# Patient Record
Sex: MALE | Race: WHITE | NOT HISPANIC OR LATINO | Employment: FULL TIME | URBAN - NONMETROPOLITAN AREA
[De-identification: names, ages, dates, MRNs, and addresses within clinical notes are randomized per-mention and may not be internally consistent; named-entity substitution may affect disease eponyms.]

---

## 2022-03-19 ENCOUNTER — HOSPITAL ENCOUNTER (EMERGENCY)
Facility: HOSPITAL | Age: 47
Discharge: HOME | End: 2022-03-19
Attending: EMERGENCY MEDICINE | Admitting: EMERGENCY MEDICINE

## 2022-03-19 ENCOUNTER — APPOINTMENT (OUTPATIENT)
Dept: GENERAL RADIOLOGY | Facility: HOSPITAL | Age: 47
End: 2022-03-19

## 2022-03-19 VITALS
HEIGHT: 69 IN | RESPIRATION RATE: 18 BRPM | OXYGEN SATURATION: 97 % | HEART RATE: 84 BPM | SYSTOLIC BLOOD PRESSURE: 148 MMHG | TEMPERATURE: 96.8 F | BODY MASS INDEX: 31.1 KG/M2 | WEIGHT: 210 LBS | DIASTOLIC BLOOD PRESSURE: 95 MMHG

## 2022-03-19 DIAGNOSIS — N20.1 URETEROLITHIASIS: Primary | ICD-10-CM

## 2022-03-19 DIAGNOSIS — N23 RENAL COLIC ON LEFT SIDE: ICD-10-CM

## 2022-03-19 LAB
ALBUMIN SERPL-MCNC: 4 G/DL (ref 3.5–5.2)
ALBUMIN/GLOB SERPL: 1.7 G/DL
ALP SERPL-CCNC: 80 U/L (ref 39–117)
ALT SERPL W P-5'-P-CCNC: 12 U/L (ref 1–41)
ANION GAP SERPL CALCULATED.3IONS-SCNC: 7.7 MMOL/L (ref 5–15)
AST SERPL-CCNC: 18 U/L (ref 1–40)
BACTERIA UR QL AUTO: ABNORMAL /HPF
BASOPHILS # BLD AUTO: 0.02 10*3/MM3 (ref 0–0.2)
BASOPHILS NFR BLD AUTO: 0.2 % (ref 0–1.5)
BILIRUB SERPL-MCNC: 0.7 MG/DL (ref 0–1.2)
BILIRUB UR QL STRIP: NEGATIVE
BUN SERPL-MCNC: 19 MG/DL (ref 6–20)
BUN/CREAT SERPL: 14.4 (ref 7–25)
CALCIUM SPEC-SCNC: 9.1 MG/DL (ref 8.6–10.5)
CHLORIDE SERPL-SCNC: 101 MMOL/L (ref 98–107)
CLARITY UR: CLEAR
CO2 SERPL-SCNC: 27.3 MMOL/L (ref 22–29)
COLOR UR: YELLOW
CREAT SERPL-MCNC: 1.32 MG/DL (ref 0.76–1.27)
CRP SERPL-MCNC: 1.5 MG/DL (ref 0–0.5)
DEPRECATED RDW RBC AUTO: 42.9 FL (ref 37–54)
EGFRCR SERPLBLD CKD-EPI 2021: 66.5 ML/MIN/1.73
EOSINOPHIL # BLD AUTO: 0.1 10*3/MM3 (ref 0–0.4)
EOSINOPHIL NFR BLD AUTO: 1.1 % (ref 0.3–6.2)
ERYTHROCYTE [DISTWIDTH] IN BLOOD BY AUTOMATED COUNT: 12.4 % (ref 12.3–15.4)
ERYTHROCYTE [SEDIMENTATION RATE] IN BLOOD: 4 MM/HR (ref 0–15)
GLOBULIN UR ELPH-MCNC: 2.4 GM/DL
GLUCOSE SERPL-MCNC: 113 MG/DL (ref 65–99)
GLUCOSE UR STRIP-MCNC: NEGATIVE MG/DL
HCT VFR BLD AUTO: 43.7 % (ref 37.5–51)
HGB BLD-MCNC: 15 G/DL (ref 13–17.7)
HGB UR QL STRIP.AUTO: ABNORMAL
HYALINE CASTS UR QL AUTO: ABNORMAL /LPF
IMM GRANULOCYTES # BLD AUTO: 0.02 10*3/MM3 (ref 0–0.05)
IMM GRANULOCYTES NFR BLD AUTO: 0.2 % (ref 0–0.5)
KETONES UR QL STRIP: NEGATIVE
LEUKOCYTE ESTERASE UR QL STRIP.AUTO: NEGATIVE
LYMPHOCYTES # BLD AUTO: 1.24 10*3/MM3 (ref 0.7–3.1)
LYMPHOCYTES NFR BLD AUTO: 13.7 % (ref 19.6–45.3)
MCH RBC QN AUTO: 32.5 PG (ref 26.6–33)
MCHC RBC AUTO-ENTMCNC: 34.3 G/DL (ref 31.5–35.7)
MCV RBC AUTO: 94.6 FL (ref 79–97)
MONOCYTES # BLD AUTO: 0.84 10*3/MM3 (ref 0.1–0.9)
MONOCYTES NFR BLD AUTO: 9.3 % (ref 5–12)
NEUTROPHILS NFR BLD AUTO: 6.83 10*3/MM3 (ref 1.7–7)
NEUTROPHILS NFR BLD AUTO: 75.5 % (ref 42.7–76)
NITRITE UR QL STRIP: NEGATIVE
NRBC BLD AUTO-RTO: 0 /100 WBC (ref 0–0.2)
PH UR STRIP.AUTO: 7 [PH] (ref 5–8)
PLATELET # BLD AUTO: 161 10*3/MM3 (ref 140–450)
PMV BLD AUTO: 9.2 FL (ref 6–12)
POTASSIUM SERPL-SCNC: 4.1 MMOL/L (ref 3.5–5.2)
PROT SERPL-MCNC: 6.4 G/DL (ref 6–8.5)
PROT UR QL STRIP: NEGATIVE
RBC # BLD AUTO: 4.62 10*6/MM3 (ref 4.14–5.8)
RBC # UR STRIP: ABNORMAL /HPF
REF LAB TEST METHOD: ABNORMAL
SODIUM SERPL-SCNC: 136 MMOL/L (ref 136–145)
SP GR UR STRIP: 1.02 (ref 1–1.03)
SQUAMOUS #/AREA URNS HPF: ABNORMAL /HPF
UROBILINOGEN UR QL STRIP: ABNORMAL
WBC # UR STRIP: ABNORMAL /HPF
WBC NRBC COR # BLD: 9.05 10*3/MM3 (ref 3.4–10.8)

## 2022-03-19 PROCEDURE — 96374 THER/PROPH/DIAG INJ IV PUSH: CPT

## 2022-03-19 PROCEDURE — 25010000002 ONDANSETRON PER 1 MG: Performed by: EMERGENCY MEDICINE

## 2022-03-19 PROCEDURE — 99283 EMERGENCY DEPT VISIT LOW MDM: CPT

## 2022-03-19 PROCEDURE — 86140 C-REACTIVE PROTEIN: CPT | Performed by: EMERGENCY MEDICINE

## 2022-03-19 PROCEDURE — 25010000002 HYDROMORPHONE 1 MG/ML SOLUTION: Performed by: EMERGENCY MEDICINE

## 2022-03-19 PROCEDURE — 96375 TX/PRO/DX INJ NEW DRUG ADDON: CPT

## 2022-03-19 PROCEDURE — 80053 COMPREHEN METABOLIC PANEL: CPT | Performed by: EMERGENCY MEDICINE

## 2022-03-19 PROCEDURE — 74018 RADEX ABDOMEN 1 VIEW: CPT | Performed by: RADIOLOGY

## 2022-03-19 PROCEDURE — 85025 COMPLETE CBC W/AUTO DIFF WBC: CPT | Performed by: EMERGENCY MEDICINE

## 2022-03-19 PROCEDURE — 74018 RADEX ABDOMEN 1 VIEW: CPT

## 2022-03-19 PROCEDURE — 96376 TX/PRO/DX INJ SAME DRUG ADON: CPT

## 2022-03-19 PROCEDURE — 81001 URINALYSIS AUTO W/SCOPE: CPT | Performed by: PHYSICIAN ASSISTANT

## 2022-03-19 PROCEDURE — 85652 RBC SED RATE AUTOMATED: CPT | Performed by: EMERGENCY MEDICINE

## 2022-03-19 RX ORDER — OXYCODONE AND ACETAMINOPHEN 10; 325 MG/1; MG/1
1 TABLET ORAL EVERY 6 HOURS PRN
Qty: 4 TABLET | Refills: 0 | Status: SHIPPED | OUTPATIENT
Start: 2022-03-19 | End: 2022-03-21 | Stop reason: SDUPTHER

## 2022-03-19 RX ORDER — SODIUM CHLORIDE 0.9 % (FLUSH) 0.9 %
10 SYRINGE (ML) INJECTION AS NEEDED
Status: DISCONTINUED | OUTPATIENT
Start: 2022-03-19 | End: 2022-03-19 | Stop reason: HOSPADM

## 2022-03-19 RX ORDER — ONDANSETRON 2 MG/ML
4 INJECTION INTRAMUSCULAR; INTRAVENOUS ONCE
Status: COMPLETED | OUTPATIENT
Start: 2022-03-19 | End: 2022-03-19

## 2022-03-19 RX ADMIN — HYDROMORPHONE HYDROCHLORIDE 1 MG: 1 INJECTION, SOLUTION INTRAMUSCULAR; INTRAVENOUS; SUBCUTANEOUS at 17:01

## 2022-03-19 RX ADMIN — ONDANSETRON 4 MG: 2 INJECTION INTRAMUSCULAR; INTRAVENOUS at 13:13

## 2022-03-19 RX ADMIN — HYDROMORPHONE HYDROCHLORIDE 1 MG: 1 INJECTION, SOLUTION INTRAMUSCULAR; INTRAVENOUS; SUBCUTANEOUS at 13:12

## 2022-03-19 RX ADMIN — SODIUM CHLORIDE 1000 ML: 9 INJECTION, SOLUTION INTRAVENOUS at 13:13

## 2022-03-19 NOTE — ED PROVIDER NOTES
Subjective   46-year-old male who presents to the emergency department with chief complaint left-sided flank pain.  Patient states he was recently seen at [facility] and diagnosed with a 10 mm stone.  Patient has had associated nausea and vomiting.      History provided by:  Patient   used: No    Flank Pain  Pain location:  L flank  Pain quality: aching, sharp and stabbing    Pain radiates to:  Does not radiate  Pain severity:  Moderate  Onset quality:  Gradual  Duration:  1 day  Timing:  Intermittent  Progression:  Worsening  Chronicity:  New  Context: not alcohol use, not awakening from sleep, not eating, not previous surgeries, not recent illness, not recent sexual activity, not recent travel, not retching, not sick contacts, not suspicious food intake and not trauma    Relieved by:  Nothing  Worsened by:  Nothing  Ineffective treatments:  None tried  Associated symptoms: chills, fatigue and nausea    Associated symptoms: no chest pain, no cough, no flatus, no hematemesis, no hematochezia, no shortness of breath, no sore throat, no vaginal bleeding and no vaginal discharge    Risk factors: no alcohol abuse, not elderly, has not had multiple surgeries, no NSAID use, not pregnant and no recent hospitalization        Review of Systems   Constitutional: Positive for chills and fatigue.   HENT: Negative.  Negative for drooling, ear discharge, ear pain, facial swelling, mouth sores, nosebleeds, postnasal drip, rhinorrhea, sinus pain and sore throat.    Respiratory: Negative.  Negative for cough and shortness of breath.    Cardiovascular: Negative.  Negative for chest pain, palpitations and leg swelling.   Gastrointestinal: Positive for abdominal distention, abdominal pain and nausea. Negative for flatus, hematemesis and hematochezia.   Endocrine: Negative.  Negative for heat intolerance, polydipsia, polyphagia and polyuria.   Genitourinary: Positive for flank pain.  Negative for difficulty urinating, penile discharge, penile pain, vaginal bleeding and vaginal discharge.   Musculoskeletal: Negative for back pain, gait problem, joint swelling, myalgias and neck pain.   Skin: Negative.  Negative for color change, pallor, rash and wound.   Hematological: Negative.  Negative for adenopathy. Does not bruise/bleed easily.   Psychiatric/Behavioral: Negative.  Negative for behavioral problems, confusion, decreased concentration, dysphoric mood, hallucinations and self-injury. The patient is not nervous/anxious and is not hyperactive.    All other systems reviewed and are negative.      No past medical history on file.    No Known Allergies    No past surgical history on file.    No family history on file.    Social History     Socioeconomic History   • Marital status: Single           Objective   Physical Exam  Vitals and nursing note reviewed.   Constitutional:       General: He is not in acute distress.     Appearance: Normal appearance. He is normal weight. He is not ill-appearing, toxic-appearing or diaphoretic.   HENT:      Head: Normocephalic and atraumatic.      Right Ear: Tympanic membrane, ear canal and external ear normal. There is no impacted cerumen.      Left Ear: Tympanic membrane, ear canal and external ear normal. There is no impacted cerumen.      Nose: Nose normal. No congestion or rhinorrhea.      Mouth/Throat:      Mouth: Mucous membranes are moist.      Pharynx: Oropharynx is clear. No oropharyngeal exudate or posterior oropharyngeal erythema.   Eyes:      General: No scleral icterus.        Right eye: No discharge.         Left eye: No discharge.      Extraocular Movements: Extraocular movements intact.      Conjunctiva/sclera: Conjunctivae normal.      Pupils: Pupils are equal, round, and reactive to light.   Cardiovascular:      Rate and Rhythm: Normal rate and regular rhythm.      Pulses: Normal pulses.      Heart sounds: Normal heart sounds. No murmur heard.     No friction rub. No gallop.   Pulmonary:      Effort: Pulmonary effort is normal. No respiratory distress.      Breath sounds: Normal breath sounds. No stridor. No wheezing, rhonchi or rales.   Chest:      Chest wall: No tenderness.   Abdominal:      General: Abdomen is flat. Bowel sounds are normal. There is no distension.      Palpations: There is no mass.      Tenderness: There is no abdominal tenderness. There is no left CVA tenderness, guarding or rebound.      Hernia: No hernia is present.   Musculoskeletal:         General: No swelling, tenderness, deformity or signs of injury. Normal range of motion.      Cervical back: Normal range of motion and neck supple. No rigidity or tenderness.      Right lower leg: No edema.      Left lower leg: No edema.   Lymphadenopathy:      Cervical: No cervical adenopathy.   Skin:     General: Skin is warm and dry.      Capillary Refill: Capillary refill takes less than 2 seconds.      Coloration: Skin is not jaundiced or pale.      Findings: No bruising, erythema, lesion or rash.   Neurological:      General: No focal deficit present.      Mental Status: He is alert and oriented to person, place, and time. Mental status is at baseline.      Cranial Nerves: No cranial nerve deficit.      Sensory: No sensory deficit.      Motor: No weakness.      Coordination: Coordination normal.      Gait: Gait normal.      Deep Tendon Reflexes: Reflexes normal.   Psychiatric:         Mood and Affect: Mood normal.         Behavior: Behavior normal.         Thought Content: Thought content normal.         Judgment: Judgment normal.         Procedures           ED Course  ED Course as of 03/19/22 1649   Sat Mar 19, 2022   1354   IMPRESSION:  Left-sided calcifications and moderate volume stool  [BH]   1611 Discussed care with Dr. Schwartz.  States to have the patient follow-up in his office Monday afternoon at 12 and will get patient scheduled for surgery. [BH]   1643 Discussed care with   Radha  as scheduled. [BH]      ED Course User Index  [] Keven Cevallos PA-C KASPER reviewed by Bebeto Covarrubias MD             [facility]    Final diagnoses:   Ureterolithiasis   Renal colic on left side       ED Disposition  ED Disposition     ED Disposition   Discharge    Condition   Stable    Comment   --             Eric Garcia MD  [address] [facility] [address]  [place] [id number] [phone]    Call in 2 days  Follow-up with urology Monday at 12:00.         Medication List      New Prescriptions    oxyCODONE-acetaminophen  MG per tablet  Commonly known as: PERCOCET  Take 1 tablet by mouth Every 6 (Six) Hours As Needed for Moderate Pain  or Severe Pain .           Where to Get Your Medications      These medications were sent to [facility] [address] [place] [address] - [address] [place] [address] [phone]  - [phone] [address] [address]    Phone: [phone]   · oxyCODONE-acetaminophen  MG per tablet          Keven Cevallos PA-C  03/19/22 3210

## 2022-03-21 ENCOUNTER — OFFICE VISIT (OUTPATIENT)
Dept: UROLOGY | Facility: CLINIC | Age: 47
End: 2022-03-21

## 2022-03-21 ENCOUNTER — APPOINTMENT (OUTPATIENT)
Dept: GENERAL RADIOLOGY | Facility: HOSPITAL | Age: 47
End: 2022-03-21

## 2022-03-21 ENCOUNTER — HOSPITAL ENCOUNTER (EMERGENCY)
Facility: HOSPITAL | Age: 47
Discharge: HOME | End: 2022-03-21
Attending: STUDENT IN AN ORGANIZED HEALTH CARE EDUCATION/TRAINING PROGRAM | Admitting: STUDENT IN AN ORGANIZED HEALTH CARE EDUCATION/TRAINING PROGRAM

## 2022-03-21 VITALS
DIASTOLIC BLOOD PRESSURE: 91 MMHG | OXYGEN SATURATION: 93 % | HEART RATE: 64 BPM | WEIGHT: 210 LBS | RESPIRATION RATE: 17 BRPM | SYSTOLIC BLOOD PRESSURE: 112 MMHG | HEIGHT: 69 IN | BODY MASS INDEX: 31.1 KG/M2 | TEMPERATURE: 98.2 F

## 2022-03-21 VITALS — BODY MASS INDEX: 31.1 KG/M2 | WEIGHT: 210 LBS | HEIGHT: 69 IN

## 2022-03-21 DIAGNOSIS — N20.0 RENAL CALCULUS, LEFT: Primary | ICD-10-CM

## 2022-03-21 DIAGNOSIS — N28.9 RENAL INSUFFICIENCY: ICD-10-CM

## 2022-03-21 DIAGNOSIS — N40.1 BENIGN PROSTATIC HYPERPLASIA WITH LOWER URINARY TRACT SYMPTOMS, SYMPTOM DETAILS UNSPECIFIED: ICD-10-CM

## 2022-03-21 DIAGNOSIS — N20.1 URETEROLITHIASIS: Primary | ICD-10-CM

## 2022-03-21 LAB
ALBUMIN SERPL-MCNC: 3.83 G/DL (ref 3.5–5.2)
ALBUMIN/GLOB SERPL: 1.3 G/DL
ALP SERPL-CCNC: 70 U/L (ref 39–117)
ALT SERPL W P-5'-P-CCNC: 13 U/L (ref 1–41)
ANION GAP SERPL CALCULATED.3IONS-SCNC: 8.9 MMOL/L (ref 5–15)
AST SERPL-CCNC: 17 U/L (ref 1–40)
BASOPHILS # BLD AUTO: 0.04 10*3/MM3 (ref 0–0.2)
BASOPHILS NFR BLD AUTO: 0.5 % (ref 0–1.5)
BILIRUB SERPL-MCNC: 0.7 MG/DL (ref 0–1.2)
BILIRUB UR QL STRIP: NEGATIVE
BUN SERPL-MCNC: 21 MG/DL (ref 6–20)
BUN/CREAT SERPL: 13.1 (ref 7–25)
CALCIUM SPEC-SCNC: 8.9 MG/DL (ref 8.6–10.5)
CHLORIDE SERPL-SCNC: 97 MMOL/L (ref 98–107)
CLARITY UR: CLEAR
CO2 SERPL-SCNC: 28.1 MMOL/L (ref 22–29)
COLOR UR: YELLOW
CREAT SERPL-MCNC: 1.6 MG/DL (ref 0.76–1.27)
DEPRECATED RDW RBC AUTO: 42.6 FL (ref 37–54)
EGFRCR SERPLBLD CKD-EPI 2021: 52.8 ML/MIN/1.73
EOSINOPHIL # BLD AUTO: 0.16 10*3/MM3 (ref 0–0.4)
EOSINOPHIL NFR BLD AUTO: 2 % (ref 0.3–6.2)
ERYTHROCYTE [DISTWIDTH] IN BLOOD BY AUTOMATED COUNT: 12.2 % (ref 12.3–15.4)
FLUAV RNA RESP QL NAA+PROBE: NOT DETECTED
FLUBV RNA RESP QL NAA+PROBE: NOT DETECTED
GLOBULIN UR ELPH-MCNC: 3 GM/DL
GLUCOSE SERPL-MCNC: 124 MG/DL (ref 65–99)
GLUCOSE UR STRIP-MCNC: NEGATIVE MG/DL
HCT VFR BLD AUTO: 40.1 % (ref 37.5–51)
HGB BLD-MCNC: 13.8 G/DL (ref 13–17.7)
HGB UR QL STRIP.AUTO: NEGATIVE
HOLD SPECIMEN: NORMAL
HOLD SPECIMEN: NORMAL
IMM GRANULOCYTES # BLD AUTO: 0.01 10*3/MM3 (ref 0–0.05)
IMM GRANULOCYTES NFR BLD AUTO: 0.1 % (ref 0–0.5)
KETONES UR QL STRIP: NEGATIVE
LEUKOCYTE ESTERASE UR QL STRIP.AUTO: NEGATIVE
LYMPHOCYTES # BLD AUTO: 1.41 10*3/MM3 (ref 0.7–3.1)
LYMPHOCYTES NFR BLD AUTO: 17.7 % (ref 19.6–45.3)
MCH RBC QN AUTO: 32.5 PG (ref 26.6–33)
MCHC RBC AUTO-ENTMCNC: 34.4 G/DL (ref 31.5–35.7)
MCV RBC AUTO: 94.4 FL (ref 79–97)
MONOCYTES # BLD AUTO: 0.78 10*3/MM3 (ref 0.1–0.9)
MONOCYTES NFR BLD AUTO: 9.8 % (ref 5–12)
NEUTROPHILS NFR BLD AUTO: 5.57 10*3/MM3 (ref 1.7–7)
NEUTROPHILS NFR BLD AUTO: 69.9 % (ref 42.7–76)
NITRITE UR QL STRIP: NEGATIVE
NRBC BLD AUTO-RTO: 0 /100 WBC (ref 0–0.2)
PH UR STRIP.AUTO: 6 [PH] (ref 5–8)
PLATELET # BLD AUTO: 152 10*3/MM3 (ref 140–450)
PMV BLD AUTO: 9.4 FL (ref 6–12)
POTASSIUM SERPL-SCNC: 4.4 MMOL/L (ref 3.5–5.2)
PROT SERPL-MCNC: 6.8 G/DL (ref 6–8.5)
PROT UR QL STRIP: NEGATIVE
RBC # BLD AUTO: 4.25 10*6/MM3 (ref 4.14–5.8)
SARS-COV-2 RNA RESP QL NAA+PROBE: NOT DETECTED
SODIUM SERPL-SCNC: 134 MMOL/L (ref 136–145)
SP GR UR STRIP: 1.02 (ref 1–1.03)
UROBILINOGEN UR QL STRIP: NORMAL
WBC NRBC COR # BLD: 7.97 10*3/MM3 (ref 3.4–10.8)
WHOLE BLOOD HOLD SPECIMEN: NORMAL
WHOLE BLOOD HOLD SPECIMEN: NORMAL

## 2022-03-21 PROCEDURE — 85025 COMPLETE CBC W/AUTO DIFF WBC: CPT | Performed by: PHYSICIAN ASSISTANT

## 2022-03-21 PROCEDURE — 99203 OFFICE O/P NEW LOW 30 MIN: CPT | Performed by: UROLOGY

## 2022-03-21 PROCEDURE — 99283 EMERGENCY DEPT VISIT LOW MDM: CPT

## 2022-03-21 PROCEDURE — 80053 COMPREHEN METABOLIC PANEL: CPT | Performed by: PHYSICIAN ASSISTANT

## 2022-03-21 PROCEDURE — 25010000002 HYDROMORPHONE 1 MG/ML SOLUTION: Performed by: STUDENT IN AN ORGANIZED HEALTH CARE EDUCATION/TRAINING PROGRAM

## 2022-03-21 PROCEDURE — 36415 COLL VENOUS BLD VENIPUNCTURE: CPT

## 2022-03-21 PROCEDURE — 87636 SARSCOV2 & INF A&B AMP PRB: CPT | Performed by: PHYSICIAN ASSISTANT

## 2022-03-21 PROCEDURE — 96374 THER/PROPH/DIAG INJ IV PUSH: CPT

## 2022-03-21 PROCEDURE — 81003 URINALYSIS AUTO W/O SCOPE: CPT | Performed by: PHYSICIAN ASSISTANT

## 2022-03-21 PROCEDURE — 96375 TX/PRO/DX INJ NEW DRUG ADDON: CPT

## 2022-03-21 PROCEDURE — 74018 RADEX ABDOMEN 1 VIEW: CPT | Performed by: RADIOLOGY

## 2022-03-21 PROCEDURE — 25010000002 ONDANSETRON PER 1 MG: Performed by: STUDENT IN AN ORGANIZED HEALTH CARE EDUCATION/TRAINING PROGRAM

## 2022-03-21 PROCEDURE — 74018 RADEX ABDOMEN 1 VIEW: CPT

## 2022-03-21 RX ORDER — PROMETHAZINE HYDROCHLORIDE 25 MG/1
25 TABLET ORAL EVERY 6 HOURS PRN
Qty: 21 TABLET | Refills: 2 | Status: SHIPPED | OUTPATIENT
Start: 2022-03-21 | End: 2022-08-09

## 2022-03-21 RX ORDER — ONDANSETRON 2 MG/ML
4 INJECTION INTRAMUSCULAR; INTRAVENOUS ONCE
Status: COMPLETED | OUTPATIENT
Start: 2022-03-21 | End: 2022-03-21

## 2022-03-21 RX ORDER — OXYCODONE AND ACETAMINOPHEN 10; 325 MG/1; MG/1
1 TABLET ORAL EVERY 6 HOURS PRN
Qty: 20 TABLET | Refills: 0 | Status: SHIPPED | OUTPATIENT
Start: 2022-03-21 | End: 2022-03-28

## 2022-03-21 RX ORDER — ONDANSETRON 4 MG/1
4 TABLET, FILM COATED ORAL EVERY 6 HOURS PRN
Qty: 15 TABLET | Refills: 0 | Status: SHIPPED | OUTPATIENT
Start: 2022-03-21 | End: 2022-09-19 | Stop reason: SDUPTHER

## 2022-03-21 RX ORDER — OXYCODONE AND ACETAMINOPHEN 10; 325 MG/1; MG/1
1 TABLET ORAL EVERY 6 HOURS PRN
Qty: 10 TABLET | Refills: 0 | Status: SHIPPED | OUTPATIENT
Start: 2022-03-21 | End: 2022-03-28 | Stop reason: SDUPTHER

## 2022-03-21 RX ORDER — GENTAMICIN SULFATE 80 MG/100ML
80 INJECTION, SOLUTION INTRAVENOUS ONCE
Status: CANCELLED | OUTPATIENT
Start: 2022-03-25 | End: 2022-03-21

## 2022-03-21 RX ADMIN — ONDANSETRON 4 MG: 2 INJECTION INTRAMUSCULAR; INTRAVENOUS at 10:32

## 2022-03-21 RX ADMIN — HYDROMORPHONE HYDROCHLORIDE 1 MG: 1 INJECTION, SOLUTION INTRAMUSCULAR; INTRAVENOUS; SUBCUTANEOUS at 10:33

## 2022-03-21 NOTE — ED NOTES
MEDICAL SCREENING:    Reason for Visit: Left-sided flank pain.  History kidney stone.    Patient initially seen in triage.  The patient was advised further evaluation and diagnostic testing will be needed, some of the treatment and testing will be initiated in the lobby in order to begin the process.  The patient will be returned to the waiting area for the time being and possibly be re-assessed by a subsequent ED provider.  The patient will be brought back to the treatment area in as timely manner as possible.         Pradip Weston PA  03/21/22 0909

## 2022-03-21 NOTE — PROGRESS NOTES
Chief Complaint:          Chief Complaint   Patient presents with   • Nephrolithiasis     Er Follow up        HPI:   46 y.o. male accompanied by his mother with a very painful left 12 mm UPJ stone.  This is actually his second stone the first 1 being treated ureteroscopically about 10 years ago.  He has been the emergency room on Friday Saturday and Sunday having severe pain I reviewed the KUB today showing a stone we discussed ureteroscopy which which can be done on Wednesday versus a lithotripsy.  Is to proceed with the least invasive alternative which is lithotripsy on Friday.  Renal calculus-we discussed the presence of the stone. We discussed the various therapeutic options available including percutaneous nephrostolithotomy, ureteroscopy and extracorporeal shockwave  lithotripsy.  We discussed the risks of lithotripsy including the passage of stones, the development of a large string of stones in the distal ureter known as Steinstrasse.  In the 3% incidence of that we will need to proceed with a ureteroscopy for obstructing fragments.  Extremely rare incidence of renal hematoma and the significance of this.  We discussed percutaneous nephrostolithotomy and its use as well as the risks and benefits such as the need for postoperative hospitalization, and the risk of damage to the kidney and the remote risk of a nephrectomy.  We also discussed the use of ureteroscopy in the upper tracts.  That this is as a decreased success rate to completely remove the stones on the first option and that very likely a stent will be required requiring an additional procedure for removal.  We discussed the absolute relative indicators for intervention including the presence of sepsis and pain we cannot control ais the primary need for urgent intervention.  We discussed placement of a stent if indicated and the management of the stent as well.  ESWL-the patient is a candidate for extracorporeal shockwave lithotripsy.  We discussed  the type of stone and the complications associated with the procedure including, but not limited to, pain in the flank, hematoma, spontaneous renal hemorrhage, inadequate fragmentation of stones, the need for passage of the stones, the need for concomitant additional procedures in the range of 24%, the risk of a distal fragment in the range of 3% requiring ureteroscopic removal, and the fact that sometimes a stent is indicated based on the size and the density of the stone as determined on the CAT scan.  Additionally, we discussed percutaneous nephrostolithotomy.  Including the mini PERC.  With its attendant risks of anesthesia bleeding infection and the fact that is a invasive procedure with the remote possibility of a nephrectomy.  We also discussed the use of ureteroscopy which is a rigid or flexible instrument placed up into the kidney to break up stones with the laser beam and very likely a postop stent and a high likelihood of additional concomitant procedures.   Past Medical History:      History reviewed. No pertinent past medical history.      Current Meds:     Current Outpatient Medications   Medication Sig Dispense Refill   • ondansetron (ZOFRAN) 4 MG tablet Take 1 tablet by mouth Every 6 (Six) Hours As Needed for Nausea or Vomiting. 15 tablet 0   • oxyCODONE-acetaminophen (PERCOCET)  MG per tablet Take 1 tablet by mouth Every 6 (Six) Hours As Needed for Moderate Pain  or Severe Pain . 10 tablet 0     No current facility-administered medications for this visit.        Allergies:      No Known Allergies     Past Surgical History:     History reviewed. No pertinent surgical history.      Social History:     Social History     Socioeconomic History   • Marital status: Single       Family History:     History reviewed. No pertinent family history.    Review of Systems:     Review of Systems   Constitutional: Negative.    HENT: Negative.    Eyes: Negative.    Respiratory: Negative.    Cardiovascular:  Negative.    Gastrointestinal: Positive for nausea and vomiting.   Endocrine: Negative.    Genitourinary: Positive for flank pain.   Allergic/Immunologic: Negative.    Neurological: Negative.    Hematological: Negative.    Psychiatric/Behavioral: Negative.        Physical Exam:     Physical Exam  Vitals and nursing note reviewed.   Constitutional:       Appearance: He is well-developed.   HENT:      Head: Normocephalic and atraumatic.   Eyes:      Conjunctiva/sclera: Conjunctivae normal.      Pupils: Pupils are equal, round, and reactive to light.   Cardiovascular:      Rate and Rhythm: Normal rate and regular rhythm.      Heart sounds: Normal heart sounds.   Pulmonary:      Effort: Pulmonary effort is normal.      Breath sounds: Normal breath sounds.   Abdominal:      General: Bowel sounds are normal.      Palpations: Abdomen is soft.   Musculoskeletal:         General: Normal range of motion.      Cervical back: Normal range of motion.   Skin:     General: Skin is warm and dry.   Neurological:      Mental Status: He is alert and oriented to person, place, and time.      Deep Tendon Reflexes: Reflexes are normal and symmetric.   Psychiatric:         Behavior: Behavior normal.         Thought Content: Thought content normal.         Judgment: Judgment normal.         I have reviewed the following portions of the patient's history: Allergies, current medications, past family history, past medical history, past social history, past surgical history, problem list, and ROS and confirm it is accurate.      Procedure:       Assessment/Plan:   Renal calculus-we discussed the presence of the stone. We discussed the various therapeutic options available including percutaneous nephrostolithotomy, ureteroscopy and extracorporeal shockwave  lithotripsy.  We discussed the risks of lithotripsy including the passage of stones, the development of a large string of stones in the distal ureter known as Steinstrasse.  In the 3%  incidence of that we will need to proceed with a ureteroscopy for obstructing fragments.  Extremely rare incidence of renal hematoma and the significance of this.  We discussed percutaneous nephrostolithotomy and its use as well as the risks and benefits such as the need for postoperative hospitalization, and the risk of damage to the kidney and the remote risk of a nephrectomy.  We also discussed the use of ureteroscopy in the upper tracts.  That this is as a decreased success rate to completely remove the stones on the first option and that very likely a stent will be required requiring an additional procedure for removal.  We discussed the absolute relative indicators for intervention including the presence of sepsis and pain we cannot control ais the primary need for urgent intervention.  We discussed placement of a stent if indicated and the management of the stent as well.  For lithotripsy on Friday, March 25  Narcotic pain medication-patient has significant acute pain that I believe would be an indication for the use of narcotic pain medication.  I discussed the significant risks of pain medication and the fact that this will be a short only option and I will give her no more than a three-day supply of pain medication, I will not plan long-term medication, and that this will be sent to a pain clinic if at all becomes necessary.  We discussed signing a pain medication agreement and the fact that we're going to run a state [facility] review to be sure the patient is not getting pain medication from elsewhere.  If this is the case, we will not give pain medication as part of the patient's treatment plan of there being prescribed a controlled substance with potential for abuse.  This patient has been well aware of the appropriate dose of such medications including the risks for somnolence, limited ability to drive and/or safety and the significant potential for overdose.  It has been made clear that these medications  are for the prescribed patient only without concomitant use of alcohol or other substance unless prescribed by the medical provider.  Has completed prescribing agreement detailing the terms of continue prescribing him a controlled substance including monitoring Jill reports, the possibility of urine drug screens, and pill counts.  The patient is aware that we review JILL reports on a regular basis and scan them into the chart.  History and physical examination exhibited continued safe and appropriate use of controlled substances. We also discussed the fact that the new [place] legislation allows only a three-day prescription for pain medication.  In this situation he will be referred to a chronic pain clinic.  Nausea and vomiting-patient has significant nausea and vomiting as a consequence of this.  We recommended Phenergan.  We also discussed the use of Zofran and the sedating side effects of Phenergan as well.  BPH: Discussed the pathophysiology of BPH and obstruction.  We discussed the static and dynamic effects of BPH as well as using 5 alpha reductase inhibitors versus alpha blockade.  We discussed the indications for transurethral surgery as well and/ or other therapeutic options available including all of the newer techniques.  He continues on Flomax for BPH symptomatology                This document has been electronically signed by PIPPA LIZARRAGA MD March 21, 2022 11:46 EDT

## 2022-03-21 NOTE — ED PROVIDER NOTES
Subjective   46-year-old white male presents secondary to left leg pain.  Patient's been having pain on and off since Friday.  Patient was seen and [facility].  He presents with a CT showing a proximal ureter ureteral stone.  Patient had KUB here which also showed said stone.  Patient's continue to have pain.  He is out of pain medication at this time.  He has not had a follow-up with urology as of today.  He is hopeful to be able to follow-up with urology and get this taken care of.  Patient states he had a similar episode 10 years ago and had to have the stone removed.  He voices no other complaints at this time.          Review of Systems   Constitutional: Negative.  Negative for fever.   HENT: Negative.    Respiratory: Negative.    Cardiovascular: Negative.  Negative for chest pain.   Gastrointestinal: Positive for nausea. Negative for abdominal pain and vomiting.   Endocrine: Negative.    Genitourinary: Positive for flank pain. Negative for dysuria.   Skin: Negative.    Neurological: Negative.    Psychiatric/Behavioral: Negative.    All other systems reviewed and are negative.      No past medical history on file.    No Known Allergies    No past surgical history on file.    No family history on file.    Social History     Socioeconomic History   • Marital status: Single           Objective   Physical Exam  Vitals and nursing note reviewed.   Constitutional:       General: He is not in acute distress.     Appearance: He is well-developed. He is not diaphoretic.   HENT:      Head: Normocephalic and atraumatic.      Right Ear: External ear normal.      Left Ear: External ear normal.      Nose: Nose normal.   Eyes:      Conjunctiva/sclera: Conjunctivae normal.      Pupils: Pupils are equal, round, and reactive to light.   Neck:      Vascular: No JVD.      Trachea: No tracheal deviation.   Cardiovascular:      Rate and Rhythm: Normal rate and regular rhythm.      Heart sounds: Normal heart sounds. No murmur  heard.  Pulmonary:      Effort: Pulmonary effort is normal. No respiratory distress.      Breath sounds: Normal breath sounds. No wheezing.   Abdominal:      General: Bowel sounds are normal.      Palpations: Abdomen is soft.      Tenderness: There is no abdominal tenderness. There is left CVA tenderness.   Musculoskeletal:         General: No deformity. Normal range of motion.      Cervical back: Normal range of motion and neck supple.   Skin:     General: Skin is warm and dry.      Coloration: Skin is not pale.      Findings: No erythema or rash.   Neurological:      Mental Status: He is alert and oriented to person, place, and time.      Cranial Nerves: No cranial nerve deficit.   Psychiatric:         Behavior: Behavior normal.         Thought Content: Thought content normal.         Procedures           ED Course  ED Course as of 03/21/22 1412   Mon Mar 21, 2022   1029 D/w Dr Garcia- will see in office at 1PM [JI]      ED Course User Index  [JI] Pradip Weston PA                                                 MDM  Number of Diagnoses or Management Options  Renal insufficiency: new and requires workup  Ureterolithiasis: established and worsening     Amount and/or Complexity of Data Reviewed  Clinical lab tests: reviewed and ordered  Tests in the radiology section of CPT®: reviewed and ordered  Discuss the patient with other providers: yes        Final diagnoses:   Ureterolithiasis   Renal insufficiency       ED Disposition  ED Disposition     ED Disposition   Discharge    Condition   Stable    Comment   --             Eric Garcia MD  [address] [place] [id number] [phone]    Today  at 1 pm         Medication List      New Prescriptions    ondansetron 4 MG tablet  Commonly known as: ZOFRAN  Take 1 tablet by mouth Every 6 (Six) Hours As Needed for Nausea or Vomiting.           Where to Get Your Medications      These medications were sent to [facility] [place] - [address]  [facility] [phone] [facility] [phone] FX  [address] [facility] [address]    Phone: [phone]   · oxyCODONE-acetaminophen  MG per tablet     You can get these medications from any pharmacy    Bring a paper prescription for each of these medications  · ondansetron 4 MG tablet          Pradip Weston PA  03/21/22 1413

## 2022-03-21 NOTE — H&P (VIEW-ONLY)
Chief Complaint:          Chief Complaint   Patient presents with   • Nephrolithiasis     Er Follow up        HPI:   46 y.o. male accompanied by his mother with a very painful left 12 mm UPJ stone.  This is actually his second stone the first 1 being treated ureteroscopically about 10 years ago.  He has been the emergency room on Friday Saturday and Sunday having severe pain I reviewed the KUB today showing a stone we discussed ureteroscopy which which can be done on Wednesday versus a lithotripsy.  Is to proceed with the least invasive alternative which is lithotripsy on Friday.  Renal calculus-we discussed the presence of the stone. We discussed the various therapeutic options available including percutaneous nephrostolithotomy, ureteroscopy and extracorporeal shockwave  lithotripsy.  We discussed the risks of lithotripsy including the passage of stones, the development of a large string of stones in the distal ureter known as Steinstrasse.  In the 3% incidence of that we will need to proceed with a ureteroscopy for obstructing fragments.  Extremely rare incidence of renal hematoma and the significance of this.  We discussed percutaneous nephrostolithotomy and its use as well as the risks and benefits such as the need for postoperative hospitalization, and the risk of damage to the kidney and the remote risk of a nephrectomy.  We also discussed the use of ureteroscopy in the upper tracts.  That this is as a decreased success rate to completely remove the stones on the first option and that very likely a stent will be required requiring an additional procedure for removal.  We discussed the absolute relative indicators for intervention including the presence of sepsis and pain we cannot control ais the primary need for urgent intervention.  We discussed placement of a stent if indicated and the management of the stent as well.  ESWL-the patient is a candidate for extracorporeal shockwave lithotripsy.  We discussed  the type of stone and the complications associated with the procedure including, but not limited to, pain in the flank, hematoma, spontaneous renal hemorrhage, inadequate fragmentation of stones, the need for passage of the stones, the need for concomitant additional procedures in the range of 24%, the risk of a distal fragment in the range of 3% requiring ureteroscopic removal, and the fact that sometimes a stent is indicated based on the size and the density of the stone as determined on the CAT scan.  Additionally, we discussed percutaneous nephrostolithotomy.  Including the mini PERC.  With its attendant risks of anesthesia bleeding infection and the fact that is a invasive procedure with the remote possibility of a nephrectomy.  We also discussed the use of ureteroscopy which is a rigid or flexible instrument placed up into the kidney to break up stones with the laser beam and very likely a postop stent and a high likelihood of additional concomitant procedures.   Past Medical History:      History reviewed. No pertinent past medical history.      Current Meds:     Current Outpatient Medications   Medication Sig Dispense Refill   • ondansetron (ZOFRAN) 4 MG tablet Take 1 tablet by mouth Every 6 (Six) Hours As Needed for Nausea or Vomiting. 15 tablet 0   • oxyCODONE-acetaminophen (PERCOCET)  MG per tablet Take 1 tablet by mouth Every 6 (Six) Hours As Needed for Moderate Pain  or Severe Pain . 10 tablet 0     No current facility-administered medications for this visit.        Allergies:      No Known Allergies     Past Surgical History:     History reviewed. No pertinent surgical history.      Social History:     Social History     Socioeconomic History   • Marital status: Single       Family History:     History reviewed. No pertinent family history.    Review of Systems:     Review of Systems   Constitutional: Negative.    HENT: Negative.    Eyes: Negative.    Respiratory: Negative.    Cardiovascular:  Negative.    Gastrointestinal: Positive for nausea and vomiting.   Endocrine: Negative.    Genitourinary: Positive for flank pain.   Allergic/Immunologic: Negative.    Neurological: Negative.    Hematological: Negative.    Psychiatric/Behavioral: Negative.        Physical Exam:     Physical Exam  Vitals and nursing note reviewed.   Constitutional:       Appearance: He is well-developed.   HENT:      Head: Normocephalic and atraumatic.   Eyes:      Conjunctiva/sclera: Conjunctivae normal.      Pupils: Pupils are equal, round, and reactive to light.   Cardiovascular:      Rate and Rhythm: Normal rate and regular rhythm.      Heart sounds: Normal heart sounds.   Pulmonary:      Effort: Pulmonary effort is normal.      Breath sounds: Normal breath sounds.   Abdominal:      General: Bowel sounds are normal.      Palpations: Abdomen is soft.   Musculoskeletal:         General: Normal range of motion.      Cervical back: Normal range of motion.   Skin:     General: Skin is warm and dry.   Neurological:      Mental Status: He is alert and oriented to person, place, and time.      Deep Tendon Reflexes: Reflexes are normal and symmetric.   Psychiatric:         Behavior: Behavior normal.         Thought Content: Thought content normal.         Judgment: Judgment normal.         I have reviewed the following portions of the patient's history: Allergies, current medications, past family history, past medical history, past social history, past surgical history, problem list, and ROS and confirm it is accurate.      Procedure:       Assessment/Plan:   Renal calculus-we discussed the presence of the stone. We discussed the various therapeutic options available including percutaneous nephrostolithotomy, ureteroscopy and extracorporeal shockwave  lithotripsy.  We discussed the risks of lithotripsy including the passage of stones, the development of a large string of stones in the distal ureter known as Steinstrasse.  In the 3%  incidence of that we will need to proceed with a ureteroscopy for obstructing fragments.  Extremely rare incidence of renal hematoma and the significance of this.  We discussed percutaneous nephrostolithotomy and its use as well as the risks and benefits such as the need for postoperative hospitalization, and the risk of damage to the kidney and the remote risk of a nephrectomy.  We also discussed the use of ureteroscopy in the upper tracts.  That this is as a decreased success rate to completely remove the stones on the first option and that very likely a stent will be required requiring an additional procedure for removal.  We discussed the absolute relative indicators for intervention including the presence of sepsis and pain we cannot control ais the primary need for urgent intervention.  We discussed placement of a stent if indicated and the management of the stent as well.  For lithotripsy on Friday, March 25  Narcotic pain medication-patient has significant acute pain that I believe would be an indication for the use of narcotic pain medication.  I discussed the significant risks of pain medication and the fact that this will be a short only option and I will give her no more than a three-day supply of pain medication, I will not plan long-term medication, and that this will be sent to a pain clinic if at all becomes necessary.  We discussed signing a pain medication agreement and the fact that we're going to run a state [facility] review to be sure the patient is not getting pain medication from elsewhere.  If this is the case, we will not give pain medication as part of the patient's treatment plan of there being prescribed a controlled substance with potential for abuse.  This patient has been well aware of the appropriate dose of such medications including the risks for somnolence, limited ability to drive and/or safety and the significant potential for overdose.  It has been made clear that these medications  are for the prescribed patient only without concomitant use of alcohol or other substance unless prescribed by the medical provider.  Has completed prescribing agreement detailing the terms of continue prescribing him a controlled substance including monitoring Jill reports, the possibility of urine drug screens, and pill counts.  The patient is aware that we review JILL reports on a regular basis and scan them into the chart.  History and physical examination exhibited continued safe and appropriate use of controlled substances. We also discussed the fact that the new [place] legislation allows only a three-day prescription for pain medication.  In this situation he will be referred to a chronic pain clinic.  Nausea and vomiting-patient has significant nausea and vomiting as a consequence of this.  We recommended Phenergan.  We also discussed the use of Zofran and the sedating side effects of Phenergan as well.  BPH: Discussed the pathophysiology of BPH and obstruction.  We discussed the static and dynamic effects of BPH as well as using 5 alpha reductase inhibitors versus alpha blockade.  We discussed the indications for transurethral surgery as well and/ or other therapeutic options available including all of the newer techniques.  He continues on Flomax for BPH symptomatology                This document has been electronically signed by PIPPA LIZARRAGA MD March 21, 2022 11:46 EDT

## 2022-03-23 ENCOUNTER — LAB (OUTPATIENT)
Dept: LAB | Facility: HOSPITAL | Age: 47
End: 2022-03-23

## 2022-03-23 ENCOUNTER — PRE-ADMISSION TESTING (OUTPATIENT)
Dept: PREADMISSION TESTING | Facility: HOSPITAL | Age: 47
End: 2022-03-23

## 2022-03-23 DIAGNOSIS — N20.0 RENAL CALCULUS, LEFT: ICD-10-CM

## 2022-03-23 LAB — SARS-COV-2 RNA RESP QL NAA+PROBE: NOT DETECTED

## 2022-03-23 PROCEDURE — C9803 HOPD COVID-19 SPEC COLLECT: HCPCS

## 2022-03-23 PROCEDURE — 87635 SARS-COV-2 COVID-19 AMP PRB: CPT | Performed by: UROLOGY

## 2022-03-23 RX ORDER — TAMSULOSIN HYDROCHLORIDE 0.4 MG/1
1 CAPSULE ORAL DAILY
COMMUNITY
End: 2022-08-23 | Stop reason: SDUPTHER

## 2022-03-25 ENCOUNTER — ANESTHESIA EVENT (OUTPATIENT)
Dept: PERIOP | Facility: HOSPITAL | Age: 47
End: 2022-03-25

## 2022-03-25 ENCOUNTER — HOSPITAL ENCOUNTER (OUTPATIENT)
Facility: HOSPITAL | Age: 47
Discharge: HOME | End: 2022-03-25
Attending: UROLOGY | Admitting: UROLOGY

## 2022-03-25 ENCOUNTER — PATIENT ROUNDING (BHMG ONLY) (OUTPATIENT)
Dept: UROLOGY | Facility: CLINIC | Age: 47
End: 2022-03-25

## 2022-03-25 ENCOUNTER — ANESTHESIA (OUTPATIENT)
Dept: PERIOP | Facility: HOSPITAL | Age: 47
End: 2022-03-25

## 2022-03-25 VITALS
SYSTOLIC BLOOD PRESSURE: 120 MMHG | WEIGHT: 208.4 LBS | TEMPERATURE: 98.3 F | RESPIRATION RATE: 14 BRPM | DIASTOLIC BLOOD PRESSURE: 65 MMHG | OXYGEN SATURATION: 99 % | HEIGHT: 69 IN | BODY MASS INDEX: 30.87 KG/M2 | HEART RATE: 62 BPM

## 2022-03-25 DIAGNOSIS — N20.0 RENAL CALCULUS, LEFT: ICD-10-CM

## 2022-03-25 PROCEDURE — 25010000002 PROPOFOL 10 MG/ML EMULSION: Performed by: NURSE ANESTHETIST, CERTIFIED REGISTERED

## 2022-03-25 PROCEDURE — 25010000002 ONDANSETRON PER 1 MG: Performed by: NURSE ANESTHETIST, CERTIFIED REGISTERED

## 2022-03-25 PROCEDURE — 25010000002 FENTANYL CITRATE (PF) 50 MCG/ML SOLUTION: Performed by: NURSE ANESTHETIST, CERTIFIED REGISTERED

## 2022-03-25 PROCEDURE — 25010000002 MIDAZOLAM PER 1 MG: Performed by: NURSE ANESTHETIST, CERTIFIED REGISTERED

## 2022-03-25 PROCEDURE — 50590 FRAGMENTING OF KIDNEY STONE: CPT | Performed by: UROLOGY

## 2022-03-25 PROCEDURE — 25010000002 KETOROLAC TROMETHAMINE PER 15 MG: Performed by: NURSE ANESTHETIST, CERTIFIED REGISTERED

## 2022-03-25 PROCEDURE — 25010000002 GENTAMICIN PER 80 MG: Performed by: UROLOGY

## 2022-03-25 RX ORDER — ONDANSETRON 2 MG/ML
INJECTION INTRAMUSCULAR; INTRAVENOUS AS NEEDED
Status: DISCONTINUED | OUTPATIENT
Start: 2022-03-25 | End: 2022-03-25 | Stop reason: SURG

## 2022-03-25 RX ORDER — SODIUM CHLORIDE 0.9 % (FLUSH) 0.9 %
10 SYRINGE (ML) INJECTION AS NEEDED
Status: DISCONTINUED | OUTPATIENT
Start: 2022-03-25 | End: 2022-03-25 | Stop reason: HOSPADM

## 2022-03-25 RX ORDER — FENTANYL CITRATE 50 UG/ML
INJECTION, SOLUTION INTRAMUSCULAR; INTRAVENOUS AS NEEDED
Status: DISCONTINUED | OUTPATIENT
Start: 2022-03-25 | End: 2022-03-25 | Stop reason: SURG

## 2022-03-25 RX ORDER — MIDAZOLAM HYDROCHLORIDE 1 MG/ML
INJECTION INTRAMUSCULAR; INTRAVENOUS AS NEEDED
Status: DISCONTINUED | OUTPATIENT
Start: 2022-03-25 | End: 2022-03-25 | Stop reason: SURG

## 2022-03-25 RX ORDER — SODIUM CHLORIDE 0.9 % (FLUSH) 0.9 %
10 SYRINGE (ML) INJECTION EVERY 12 HOURS SCHEDULED
Status: DISCONTINUED | OUTPATIENT
Start: 2022-03-25 | End: 2022-03-25 | Stop reason: HOSPADM

## 2022-03-25 RX ORDER — PROPOFOL 10 MG/ML
VIAL (ML) INTRAVENOUS AS NEEDED
Status: DISCONTINUED | OUTPATIENT
Start: 2022-03-25 | End: 2022-03-25 | Stop reason: SURG

## 2022-03-25 RX ORDER — OXYCODONE AND ACETAMINOPHEN 10; 325 MG/1; MG/1
1 TABLET ORAL EVERY 4 HOURS PRN
Qty: 12 TABLET | Refills: 0 | Status: SHIPPED | OUTPATIENT
Start: 2022-03-25 | End: 2022-08-09

## 2022-03-25 RX ORDER — FENTANYL CITRATE 50 UG/ML
50 INJECTION, SOLUTION INTRAMUSCULAR; INTRAVENOUS
Status: DISCONTINUED | OUTPATIENT
Start: 2022-03-25 | End: 2022-03-25 | Stop reason: HOSPADM

## 2022-03-25 RX ORDER — LIDOCAINE HYDROCHLORIDE 20 MG/ML
INJECTION, SOLUTION INFILTRATION; PERINEURAL AS NEEDED
Status: DISCONTINUED | OUTPATIENT
Start: 2022-03-25 | End: 2022-03-25 | Stop reason: SURG

## 2022-03-25 RX ORDER — KETOROLAC TROMETHAMINE 30 MG/ML
INJECTION, SOLUTION INTRAMUSCULAR; INTRAVENOUS AS NEEDED
Status: DISCONTINUED | OUTPATIENT
Start: 2022-03-25 | End: 2022-03-25 | Stop reason: SURG

## 2022-03-25 RX ORDER — SODIUM CHLORIDE, SODIUM LACTATE, POTASSIUM CHLORIDE, CALCIUM CHLORIDE 600; 310; 30; 20 MG/100ML; MG/100ML; MG/100ML; MG/100ML
INJECTION, SOLUTION INTRAVENOUS CONTINUOUS PRN
Status: DISCONTINUED | OUTPATIENT
Start: 2022-03-25 | End: 2022-03-25 | Stop reason: SURG

## 2022-03-25 RX ORDER — DROPERIDOL 2.5 MG/ML
0.62 INJECTION, SOLUTION INTRAMUSCULAR; INTRAVENOUS ONCE AS NEEDED
Status: DISCONTINUED | OUTPATIENT
Start: 2022-03-25 | End: 2022-03-25 | Stop reason: HOSPADM

## 2022-03-25 RX ORDER — IPRATROPIUM BROMIDE AND ALBUTEROL SULFATE 2.5; .5 MG/3ML; MG/3ML
3 SOLUTION RESPIRATORY (INHALATION) ONCE AS NEEDED
Status: DISCONTINUED | OUTPATIENT
Start: 2022-03-25 | End: 2022-03-25 | Stop reason: HOSPADM

## 2022-03-25 RX ORDER — ONDANSETRON 2 MG/ML
4 INJECTION INTRAMUSCULAR; INTRAVENOUS AS NEEDED
Status: DISCONTINUED | OUTPATIENT
Start: 2022-03-25 | End: 2022-03-25 | Stop reason: HOSPADM

## 2022-03-25 RX ORDER — FAMOTIDINE 10 MG/ML
INJECTION, SOLUTION INTRAVENOUS AS NEEDED
Status: DISCONTINUED | OUTPATIENT
Start: 2022-03-25 | End: 2022-03-25 | Stop reason: SURG

## 2022-03-25 RX ORDER — MIDAZOLAM HYDROCHLORIDE 1 MG/ML
1 INJECTION INTRAMUSCULAR; INTRAVENOUS
Status: DISCONTINUED | OUTPATIENT
Start: 2022-03-25 | End: 2022-03-25 | Stop reason: HOSPADM

## 2022-03-25 RX ORDER — SODIUM CHLORIDE, SODIUM LACTATE, POTASSIUM CHLORIDE, CALCIUM CHLORIDE 600; 310; 30; 20 MG/100ML; MG/100ML; MG/100ML; MG/100ML
125 INJECTION, SOLUTION INTRAVENOUS ONCE
Status: COMPLETED | OUTPATIENT
Start: 2022-03-25 | End: 2022-03-25

## 2022-03-25 RX ORDER — SODIUM CHLORIDE, SODIUM LACTATE, POTASSIUM CHLORIDE, CALCIUM CHLORIDE 600; 310; 30; 20 MG/100ML; MG/100ML; MG/100ML; MG/100ML
100 INJECTION, SOLUTION INTRAVENOUS ONCE AS NEEDED
Status: DISCONTINUED | OUTPATIENT
Start: 2022-03-25 | End: 2022-03-25 | Stop reason: HOSPADM

## 2022-03-25 RX ORDER — OXYCODONE HYDROCHLORIDE AND ACETAMINOPHEN 5; 325 MG/1; MG/1
1 TABLET ORAL ONCE AS NEEDED
Status: DISCONTINUED | OUTPATIENT
Start: 2022-03-25 | End: 2022-03-25 | Stop reason: HOSPADM

## 2022-03-25 RX ORDER — GENTAMICIN SULFATE 80 MG/100ML
80 INJECTION, SOLUTION INTRAVENOUS ONCE
Status: COMPLETED | OUTPATIENT
Start: 2022-03-25 | End: 2022-03-25

## 2022-03-25 RX ORDER — MEPERIDINE HYDROCHLORIDE 25 MG/ML
12.5 INJECTION INTRAMUSCULAR; INTRAVENOUS; SUBCUTANEOUS
Status: DISCONTINUED | OUTPATIENT
Start: 2022-03-25 | End: 2022-03-25 | Stop reason: HOSPADM

## 2022-03-25 RX ADMIN — GENTAMICIN SULFATE 80 MG: 80 INJECTION, SOLUTION INTRAVENOUS at 10:37

## 2022-03-25 RX ADMIN — LIDOCAINE HYDROCHLORIDE 20 MG: 20 INJECTION, SOLUTION INFILTRATION; PERINEURAL at 10:35

## 2022-03-25 RX ADMIN — SODIUM CHLORIDE, POTASSIUM CHLORIDE, SODIUM LACTATE AND CALCIUM CHLORIDE 125 ML/HR: 600; 310; 30; 20 INJECTION, SOLUTION INTRAVENOUS at 09:49

## 2022-03-25 RX ADMIN — KETOROLAC TROMETHAMINE 30 MG: 30 INJECTION, SOLUTION INTRAMUSCULAR at 10:37

## 2022-03-25 RX ADMIN — ONDANSETRON 4 MG: 2 INJECTION INTRAMUSCULAR; INTRAVENOUS at 10:29

## 2022-03-25 RX ADMIN — PROPOFOL 150 MG: 10 INJECTION, EMULSION INTRAVENOUS at 10:35

## 2022-03-25 RX ADMIN — FENTANYL CITRATE 100 MCG: 50 INJECTION INTRAMUSCULAR; INTRAVENOUS at 11:01

## 2022-03-25 RX ADMIN — SODIUM CHLORIDE, POTASSIUM CHLORIDE, SODIUM LACTATE AND CALCIUM CHLORIDE: 600; 310; 30; 20 INJECTION, SOLUTION INTRAVENOUS at 10:29

## 2022-03-25 RX ADMIN — FAMOTIDINE 20 MG: 10 INJECTION INTRAVENOUS at 10:29

## 2022-03-25 RX ADMIN — MIDAZOLAM 2 MG: 1 INJECTION INTRAMUSCULAR; INTRAVENOUS at 10:29

## 2022-03-25 NOTE — OP NOTE
EXTRACORPOREAL SHOCKWAVE LITHOTRIPSY  Procedure Note    Dorita Conte  3/25/2022    Pre-op Diagnosis:   Renal calculus, left [N20.0]    Post-op Diagnosis:     Post-Op Diagnosis Codes:     * Renal calculus, left [N20.0]    Procedure/CPT® Codes:  46-year-old white male here with a painful left UPJ stone for lithotripsy.  ESWL-the patient is a candidate for extracorporeal shockwave lithotripsy.  We discussed the type of stone and the complications associated with the procedure including, but not limited to, pain in the flank, hematoma, spontaneous renal hemorrhage, inadequate fragmentation of stones, the need for passage of the stones, the need for concomitant additional procedures in the range of 24%, the risk of a distal fragment in the range of 3% requiring ureteroscopic removal, and the fact that sometimes a stent is indicated based on the size and the density of the stone as determined on the CAT scan.  Additionally, we discussed percutaneous nephrostolithotomy.  Including the mini PERC.  With its attendant risks of anesthesia bleeding infection and the fact that is a invasive procedure with the remote possibility of a nephrectomy.  We also discussed the use of ureteroscopy which is a rigid or flexible instrument placed up into the kidney to break up stones with the laser beam and very likely a postop stent and a high likelihood of additional concomitant procedures.  Following an informed consent, he was brought to the operative suite and underwent induction of general endotracheal anesthetic.  The stone was localized at F2 and a total of 3000 shockwaves was administered without complication.  There was excellent fragmentation  He was awake and alert and returned to recovery room.         Procedure(s):  EXTRACORPOREAL SHOCKWAVE LITHOTRIPSY    Surgeon(s):  Eric Garcia MD    Anesthesia: see anesthesia record    Staff:   Circulator: Dennis Powell RN  Scrub Person: Leyda Green  Jani  Assistant: Iveth Mora LPN    Estimated Blood Loss: none  Urine Voided: * No values recorded between 3/25/2022 10:30 AM and 3/25/2022 11:05 AM *    Specimens:                None      Drains: None    Findings: Moderate fragmentation    Blood: N/A    Complications: None    Grafts and Implants: None    Eric Garcia MD     Date: 3/25/2022  Time: 11:05 EDT

## 2022-03-25 NOTE — PROGRESS NOTES
March 25, 2022    Hello, may I speak with Dorita Conte?    My name is April      I am  with [facility]  [facility]  [address] [place] [address] [phone].    Before we get started may I verify your date of birth? 1975    I am calling to officially welcome you to our practice and ask about your recent visit. Is this a good time to talk? yes    Tell me about your visit with us. What things went well?  No complaints.       We're always looking for ways to make our patients' experiences even better. Do you have recommendations on ways we may improve?  no    Overall were you satisfied with your first visit to our practice? yes       I appreciate you taking the time to speak with me today. Is there anything else I can do for you? no      Thank you, and have a great day.

## 2022-03-25 NOTE — ANESTHESIA POSTPROCEDURE EVALUATION
Patient: Dorita Conte    Procedure Summary     Date: 03/25/22 Room / [facility]  [facility]    Anesthesia Start: 1029 Anesthesia Stop: 1108    Procedure: EXTRACORPOREAL SHOCKWAVE LITHOTRIPSY (Left ) Diagnosis:       Renal calculus, left      (Renal calculus, left [N20.0])    Surgeons: Eric Garcia MD Provider: Chepe Rdz MD    Anesthesia Type: general ASA Status: 2          Anesthesia Type: general    Vitals  Vitals Value Taken Time   /77 03/25/22 1125   Temp 97.3 °F (36.3 °C) 03/25/22 1110   Pulse 80 03/25/22 1125   Resp 14 03/25/22 1125   SpO2 100 % 03/25/22 1125           Post Anesthesia Care and Evaluation    Patient location during evaluation: bedside  Patient participation: complete - patient participated  Level of consciousness: awake and alert  Pain score: 1  Pain management: adequate  Airway patency: patent  Anesthetic complications: No anesthetic complications  PONV Status: none  Cardiovascular status: acceptable  Respiratory status: acceptable  Hydration status: acceptable

## 2022-03-25 NOTE — ANESTHESIA PROCEDURE NOTES
Airway  Urgency: elective    Date/Time: 3/25/2022 10:35 AM  Airway not difficult    General Information and Staff    Patient location during procedure: OR  CRNA: Qiana Ledezma CRNA    Indications and Patient Condition  Indications for airway management: airway protection    Preoxygenated: yes  MILS maintained throughout  Mask difficulty assessment: 0 - not attempted    Final Airway Details  Final airway type: supraglottic airway      Successful airway: unique  Size 4    Number of attempts at approach: 1  Assessment: lips, teeth, and gum same as pre-op

## 2022-03-25 NOTE — ANESTHESIA PREPROCEDURE EVALUATION
Anesthesia Evaluation     no history of anesthetic complications:  NPO Solid Status: > 8 hours  NPO Liquid Status: > 8 hours           Airway   Mallampati: II  TM distance: >3 FB  Neck ROM: full  No difficulty expected  Dental    (+) poor dentition    Pulmonary - normal exam   (+) sleep apnea,   Cardiovascular - normal exam        Neuro/Psych  GI/Hepatic/Renal/Endo    (+)   renal disease stones,     Musculoskeletal     Abdominal  - normal exam    Bowel sounds: normal.   Substance History      OB/GYN          Other                        Anesthesia Plan    ASA 2     general     intravenous induction     Anesthetic plan, all risks, benefits, and alternatives have been provided, discussed and informed consent has been obtained with: patient.        CODE STATUS:

## 2022-03-28 ENCOUNTER — OFFICE VISIT (OUTPATIENT)
Dept: UROLOGY | Facility: CLINIC | Age: 47
End: 2022-03-28

## 2022-03-28 VITALS — BODY MASS INDEX: 30.81 KG/M2 | WEIGHT: 208 LBS | HEIGHT: 69 IN

## 2022-03-28 DIAGNOSIS — N20.0 RENAL CALCULUS, LEFT: Primary | ICD-10-CM

## 2022-03-28 PROCEDURE — 99024 POSTOP FOLLOW-UP VISIT: CPT | Performed by: UROLOGY

## 2022-03-28 PROCEDURE — 82365 CALCULUS SPECTROSCOPY: CPT | Performed by: UROLOGY

## 2022-03-28 RX ORDER — TESTOSTERONE CYPIONATE 200 MG/ML
200 INJECTION, SOLUTION INTRAMUSCULAR
COMMUNITY
Start: 2022-03-04

## 2022-04-03 LAB
CA H2 PHOS DIHYD MFR STONE: 80 %
CALCIUM OXALATE DIHYDRATE MFR STONE IR: 20 %
COLOR STONE: NORMAL
COMPN STONE: NORMAL
LABORATORY COMMENT REPORT: NORMAL
LABORATORY COMMENT REPORT: NORMAL
Lab: NORMAL
Lab: NORMAL
PHOTO: NORMAL
SIZE STONE: NORMAL MM
SPEC SOURCE SUBJ: NORMAL
WT STONE: 13 MG

## 2022-08-08 ENCOUNTER — HOSPITAL ENCOUNTER (EMERGENCY)
Facility: HOSPITAL | Age: 47
Discharge: LEFT BEFORE DISCHARGE | End: 2022-08-08

## 2022-08-08 VITALS
WEIGHT: 209 LBS | TEMPERATURE: 98 F | RESPIRATION RATE: 18 BRPM | HEART RATE: 78 BPM | BODY MASS INDEX: 30.96 KG/M2 | DIASTOLIC BLOOD PRESSURE: 100 MMHG | SYSTOLIC BLOOD PRESSURE: 154 MMHG | HEIGHT: 69 IN | OXYGEN SATURATION: 96 %

## 2022-08-08 PROCEDURE — 99282 EMERGENCY DEPT VISIT SF MDM: CPT

## 2022-08-08 PROCEDURE — 36415 COLL VENOUS BLD VENIPUNCTURE: CPT

## 2022-08-08 NOTE — ED NOTES
MEDICAL SCREENING:    Reason for Visit: Left lower abdominal pain     Patient initially seen in triage.  The patient was advised further evaluation and diagnostic testing will be needed, some of the treatment and testing will be initiated in the lobby in order to begin the process.  The patient will be returned to the waiting area for the time being and possibly be re-assessed by a subsequent ED provider.  The patient will be brought back to the treatment area in as timely manner as possible.       Tamara Clement, APRN  08/08/22 0229

## 2022-08-09 ENCOUNTER — PRE-ADMISSION TESTING (OUTPATIENT)
Dept: PREADMISSION TESTING | Facility: HOSPITAL | Age: 47
End: 2022-08-09

## 2022-08-09 ENCOUNTER — OFFICE VISIT (OUTPATIENT)
Dept: UROLOGY | Facility: CLINIC | Age: 47
End: 2022-08-09

## 2022-08-09 VITALS — BODY MASS INDEX: 30.81 KG/M2 | HEIGHT: 69 IN | WEIGHT: 208 LBS

## 2022-08-09 DIAGNOSIS — N20.1 LEFT URETERAL STONE: Primary | ICD-10-CM

## 2022-08-09 DIAGNOSIS — R10.9 BILATERAL FLANK PAIN: ICD-10-CM

## 2022-08-09 DIAGNOSIS — R10.32 LEFT LOWER QUADRANT ABDOMINAL PAIN: ICD-10-CM

## 2022-08-09 DIAGNOSIS — N20.1 LEFT URETERAL STONE: ICD-10-CM

## 2022-08-09 LAB
ALBUMIN SERPL-MCNC: 4.19 G/DL (ref 3.5–5.2)
ALBUMIN/GLOB SERPL: 1.7 G/DL
ALP SERPL-CCNC: 70 U/L (ref 39–117)
ALT SERPL W P-5'-P-CCNC: 19 U/L (ref 1–41)
ANION GAP SERPL CALCULATED.3IONS-SCNC: 8.9 MMOL/L (ref 5–15)
AST SERPL-CCNC: 17 U/L (ref 1–40)
BILIRUB SERPL-MCNC: 0.3 MG/DL (ref 0–1.2)
BUN SERPL-MCNC: 10 MG/DL (ref 6–20)
BUN/CREAT SERPL: 10.8 (ref 7–25)
CALCIUM SPEC-SCNC: 9.2 MG/DL (ref 8.6–10.5)
CHLORIDE SERPL-SCNC: 102 MMOL/L (ref 98–107)
CO2 SERPL-SCNC: 28.1 MMOL/L (ref 22–29)
CREAT SERPL-MCNC: 0.93 MG/DL (ref 0.76–1.27)
DEPRECATED RDW RBC AUTO: 45.8 FL (ref 37–54)
EGFRCR SERPLBLD CKD-EPI 2021: 100.7 ML/MIN/1.73
ERYTHROCYTE [DISTWIDTH] IN BLOOD BY AUTOMATED COUNT: 12.9 % (ref 12.3–15.4)
GLOBULIN UR ELPH-MCNC: 2.4 GM/DL
GLUCOSE SERPL-MCNC: 104 MG/DL (ref 65–99)
HCT VFR BLD AUTO: 42.6 % (ref 37.5–51)
HGB BLD-MCNC: 14.5 G/DL (ref 13–17.7)
MCH RBC QN AUTO: 32.9 PG (ref 26.6–33)
MCHC RBC AUTO-ENTMCNC: 34 G/DL (ref 31.5–35.7)
MCV RBC AUTO: 96.6 FL (ref 79–97)
PLATELET # BLD AUTO: 167 10*3/MM3 (ref 140–450)
PMV BLD AUTO: 9.6 FL (ref 6–12)
POTASSIUM SERPL-SCNC: 3.9 MMOL/L (ref 3.5–5.2)
PROT SERPL-MCNC: 6.6 G/DL (ref 6–8.5)
RBC # BLD AUTO: 4.41 10*6/MM3 (ref 4.14–5.8)
SODIUM SERPL-SCNC: 139 MMOL/L (ref 136–145)
WBC NRBC COR # BLD: 4.58 10*3/MM3 (ref 3.4–10.8)

## 2022-08-09 PROCEDURE — 99214 OFFICE O/P EST MOD 30 MIN: CPT | Performed by: NURSE PRACTITIONER

## 2022-08-09 PROCEDURE — 36415 COLL VENOUS BLD VENIPUNCTURE: CPT

## 2022-08-09 PROCEDURE — 85027 COMPLETE CBC AUTOMATED: CPT

## 2022-08-09 PROCEDURE — 80053 COMPREHEN METABOLIC PANEL: CPT

## 2022-08-09 RX ORDER — GENTAMICIN SULFATE 80 MG/100ML
80 INJECTION, SOLUTION INTRAVENOUS ONCE
Status: CANCELLED | OUTPATIENT
Start: 2022-08-09 | End: 2022-08-09

## 2022-08-09 RX ORDER — CIPROFLOXACIN 500 MG/1
TABLET, FILM COATED ORAL
COMMUNITY
Start: 2022-08-08

## 2022-08-09 RX ORDER — HYDROCODONE BITARTRATE AND ACETAMINOPHEN 10; 325 MG/1; MG/1
1 TABLET ORAL EVERY 6 HOURS PRN
Qty: 10 TABLET | Refills: 0 | Status: SHIPPED | OUTPATIENT
Start: 2022-08-09 | End: 2022-09-19 | Stop reason: SDUPTHER

## 2022-08-09 RX ORDER — KETOROLAC TROMETHAMINE 10 MG/1
TABLET, FILM COATED ORAL
COMMUNITY
Start: 2022-08-08

## 2022-08-09 NOTE — H&P (VIEW-ONLY)
Chief Complaint  Nephrolithiasis and MULTIPLE LEFT UVJ STONES/BILATERAL NEPHROLITHIASIS/FLANK/AB (ER FOLLOW UP LEFT UVJ STONES/LLQ PAIN)    Uriah Schulz presents to [facility] for MULTIPLE LEFT UVJ STONES/LLQ PAIN/FLANK PAIN  History of Present Illness    Mr. LAURA SCHULZ is a pleasant 47 year-old patient, with a significant history of recurrent kidney stones, well-known to Dr. Garcia, who returns to clinic today for evaluation with concerns for kidney stones. Reports his last ESWL was 03/25/22    THE Patient reports he presented to [facility] on Sunday 08/7/2022 morning , secondary to very sharp 10/10, aching and very consistent Left flank pain/left lower quadrant abdominal pain, radiating to his suprapubic region, causing him significant nausea with vomiting, pelvic pain/discomfort.  Patient reports he has significant testalgia, urinary symptoms of frequency urgency with burning on urination.    He had a CT scan abdomen and pelvis kidney stone protocol done which showed: There is moderate hydronephrosis of the left kidney secondary to a grouping of at least 4 medium to large stones in the distal left ureter, the largest 1 measures almost a centimeter.  There are 2 smaller stones in the lower pole of the left kidney, there is a 3 mm stone in the midpole of the right kidney, with a probable cyst in the upper pole of the left kidney measuring several centimeters.  No additional stones within the urinary bladder, but extensive calcification within the prostate.    On presentation to clinic, he is in  apparent discomfort 10/10.  He still reports left  flank pain that is still very colicky, but reports increased difficulty urinating, and finding any comfortable sitting/lying position.  He has left lower quadrant pain, and reports pressure/discomfort still in his left lower abdomen and pelvic region.  He also has testicular discomfort, and  "a sensation of \"being kicked in his groin\".       HE also reports urinary symptoms of frequency, urgency, but denies dysuria, burning on urination and gross hematuria.  He is currently on ciprofloxacin given at the ER, HE Denies nausea, no vomiting, denies chills or fevers. HIS Urine dipstick today is completely negative for any infection, IT is negative for gross/microscopic hematuria.  His IPSS score is 15, PVR is 0.    He is relatively healthy with no significant PMHx besides listed below.  We discussed getting a CT scan, and scrotal ultrasound due to his persistent discomfort    Objective   Vital Signs:   Ht 175.3 cm (69.02\")   Wt 94.3 kg (208 lb)   BMI 30.70 kg/m²       ROS:   Review of Systems   Constitutional: Positive for activity change, appetite change and fatigue. Negative for chills, diaphoresis, fever, unexpected weight gain and unexpected weight loss.   HENT: Negative for congestion, ear discharge, ear pain, nosebleeds, rhinorrhea, sinus pressure and sore throat.    Eyes: Negative for blurred vision, double vision, photophobia, pain, redness and visual disturbance.   Respiratory: Negative for apnea, cough, chest tightness, shortness of breath, wheezing and stridor.    Cardiovascular: Negative for chest pain and palpitations.   Gastrointestinal: Positive for abdominal pain. Negative for abdominal distention, constipation, diarrhea, nausea and vomiting.   Endocrine: Negative for polydipsia, polyphagia and polyuria.   Genitourinary: Positive for dysuria, flank pain, frequency, hematuria and urgency. Negative for decreased urine volume, difficulty urinating, discharge, genital sores, penile pain, penile swelling, scrotal swelling, testicular pain and urinary incontinence.   Musculoskeletal: Positive for back pain. Negative for arthralgias and joint swelling.   Skin: Positive for pallor. Negative for rash and wound.   Neurological: Negative for dizziness, tremors, syncope, weakness, light-headedness, " memory problem and confusion.   Psychiatric/Behavioral: Positive for positive for hyperactivity and stress. Negative for agitation, behavioral problems and decreased concentration. The patient is nervous/anxious.         Physical Exam  Constitutional:       General: He is in acute distress.      Appearance: He is well-developed. He is obese. He is ill-appearing.   HENT:      Head: Normocephalic and atraumatic.      Right Ear: External ear normal.      Left Ear: External ear normal.   Eyes:      General:         Right eye: No discharge.         Left eye: No discharge.      Conjunctiva/sclera: Conjunctivae normal.      Pupils: Pupils are equal, round, and reactive to light.   Neck:      Thyroid: No thyromegaly.      Trachea: No tracheal deviation.   Cardiovascular:      Rate and Rhythm: Normal rate and regular rhythm.      Heart sounds: No murmur heard.    No friction rub.   Pulmonary:      Effort: Pulmonary effort is normal. No respiratory distress.      Breath sounds: Normal breath sounds. No stridor.   Abdominal:      General: Bowel sounds are normal. There is no distension.      Palpations: Abdomen is soft.      Tenderness: There is abdominal tenderness. There is no guarding.   Genitourinary:     Penis: Normal and uncircumcised. No tenderness or discharge.       Testes: Normal.      Rectum: Normal. Guaiac result negative.      Comments: Reports urinary frequency, dysuria,   Musculoskeletal:         General: Tenderness present. No deformity. Normal range of motion.      Cervical back: Normal range of motion and neck supple.   Skin:     General: Skin is warm and dry.      Capillary Refill: Capillary refill takes less than 2 seconds.      Coloration: Skin is pale.   Neurological:      Mental Status: He is alert and oriented to person, place, and time.      Cranial Nerves: No cranial nerve deficit.      Coordination: Coordination normal.   Psychiatric:         Behavior: Behavior normal.         Thought Content:  Thought content normal.         Judgment: Judgment normal.        Result Review :            Assessment and Plan    Problem List Items Addressed This Visit        Gastrointestinal Abdominal     Left lower quadrant abdominal pain    Overview     Added automatically from request for surgery [id number]         Relevant Orders    Case Request (Completed)    COVID PRE-OP / PRE-PROCEDURE SCREENING ORDER (NO ISOLATION) - Swab, Nasopharynx    CBC (No Diff) (Completed)    Comprehensive Metabolic Panel (Completed)    Bilateral flank pain    Overview     Added automatically from request for surgery [id number]         Relevant Orders    Case Request (Completed)    COVID PRE-OP / PRE-PROCEDURE SCREENING ORDER (NO ISOLATION) - Swab, Nasopharynx    CBC (No Diff) (Completed)    Comprehensive Metabolic Panel (Completed)       Genitourinary and Reproductive     Left ureteral stone - Primary    Overview     Added automatically from request for surgery [id number]         Relevant Medications    HYDROcodone-acetaminophen (Norco)  MG per tablet    Other Relevant Orders    Case Request (Completed)    COVID PRE-OP / PRE-PROCEDURE SCREENING ORDER (NO ISOLATION) - Swab, Nasopharynx    CBC (No Diff) (Completed)    Comprehensive Metabolic Panel (Completed)          Patient reports that he is not currently experiencing any symptoms of urinary incontinence.    BMI is >= 30 and <35. (Class 1 Obesity). The following options were offered after discussion;: weight loss educational material (shared in after visit summary), exercise counseling/recommendations and nutrition counseling/recommendations      RADIOLOGY (CT AND/OR KUB):    CT Abdomen and Pelvis: No results found for this or any previous visit.     CT Stone Protocol: No results found for this or any previous visit.     KUB: Results for orders placed during the hospital encounter of 03/21/22    XR Abdomen KUB    Narrative  EXAM:  XR Abdomen, 1 View    EXAM DATE:  3/21/2022 9:38 AM    CLINICAL  HISTORY:  Left-sided flank pain with kidney stone    TECHNIQUE:  Frontal supine view of the abdomen/pelvis.    COMPARISON:  03/19/2022    FINDINGS:  Gastrointestinal tract:  Mild constipation.  No dilation.  Organs:  12.4 mm stone in the region of the proximal left ureter is  stable.  Nonobstructing left kidney stones again noted.  Bones/joints:  Unremarkable.    Impression  1.  No change in location of a 12 mm left ureteral stone.  2.  Other nonobstructing left kidney stones again noted.    This report was finalized on 3/21/2022 10:04 AM by Dr. Yazan Kat MD.       LABS (3 MONTHS):    Pre-Admission Testing on 08/09/2022   Component Date Value Ref Range Status   • WBC 08/09/2022 4.58  3.40 - 10.80 10*3/mm3 Final   • RBC 08/09/2022 4.41  4.14 - 5.80 10*6/mm3 Final   • Hemoglobin 08/09/2022 14.5  13.0 - 17.7 g/dL Final   • Hematocrit 08/09/2022 42.6  37.5 - 51.0 % Final   • MCV 08/09/2022 96.6  79.0 - 97.0 fL Final   • MCH 08/09/2022 32.9  26.6 - 33.0 pg Final   • MCHC 08/09/2022 34.0  31.5 - 35.7 g/dL Final   • RDW 08/09/2022 12.9  12.3 - 15.4 % Final   • RDW-SD 08/09/2022 45.8  37.0 - 54.0 fl Final   • MPV 08/09/2022 9.6  6.0 - 12.0 fL Final   • Platelets 08/09/2022 167  140 - 450 10*3/mm3 Final   • Glucose 08/09/2022 104 (A) 65 - 99 mg/dL Final   • BUN 08/09/2022 10  6 - 20 mg/dL Final   • Creatinine 08/09/2022 0.93  0.76 - 1.27 mg/dL Final   • Sodium 08/09/2022 139  136 - 145 mmol/L Final   • Potassium 08/09/2022 3.9  3.5 - 5.2 mmol/L Final   • Chloride 08/09/2022 102  98 - 107 mmol/L Final   • CO2 08/09/2022 28.1  22.0 - 29.0 mmol/L Final   • Calcium 08/09/2022 9.2  8.6 - 10.5 mg/dL Final   • Total Protein 08/09/2022 6.6  6.0 - 8.5 g/dL Final   • Albumin 08/09/2022 4.19  3.50 - 5.20 g/dL Final   • ALT (SGPT) 08/09/2022 19  1 - 41 U/L Final   • AST (SGOT) 08/09/2022 17  1 - 40 U/L Final   • Alkaline Phosphatase 08/09/2022 70  39 - 117 U/L Final   • Total Bilirubin 08/09/2022 0.3  0.0 - 1.2 mg/dL Final   •  Globulin 08/09/2022 2.4  gm/dL Final   • A/G Ratio 08/09/2022 1.7  g/dL Final   • BUN/Creatinine Ratio 08/09/2022 10.8  7.0 - 25.0 Final   • Anion Gap 08/09/2022 8.9  5.0 - 15.0 mmol/L Final   • eGFR 08/09/2022 100.7  >60.0 mL/min/1.73 Final    National Kidney Foundation and American Society of Nephrology (ASN) Task Force recommended calculation based on the Chronic Kidney Disease Epidemiology Collaboration (CKD-EPI) equation refit without adjustment for race.          ASSESSMENT  LEFT UVJ STONE/LLQ ABD. PAIN/ BILATERAL NEPHROLITHIASIS/FLANK PAIN  Mr. LAURA SCHULZ is a pleasant 47 year-old patient, with a significant history of recurrent kidney stones, well-known to Dr. Garcia, who returns to clinic today for evaluation with concerns for kidney stones.  He is an ER follow-up from [facility].    Left Ureteral Calculus/FLANK PAIN/DYSURIA:The Patient has been diagnosed with multiple 3MM-1 CM Left ureteral calculus.  He presents to clinic today with left flank pain that is colicky in nature, very persistent and has become very bothersome to him.  He has had 2 ER visits in the last week, he is desirous of surgical intervention at this point.     We have discussed the various parameters regarding spontaneous passage including the notion that a tiny 1 -4 mm stone like he has, has a 95% high likelihood of spontaneous passage versus a larger stone >1CM like he has, being caught up in the upper areas of the urinary tract. We also discussed the medical management of stone disease and the use of medical expulsive therapy in the form of Flomax. This is used in an off label setting. I also talked about nonoperative management including ambulation and increasing fluids and hot tub as being an effective adjuncts in the treatment of a ureteral stone. We discussed the indicators for intervention including  absolute indicators such as sepsis and uncontrollable severe pain as well as  the relative indicators of moderate  pain that is well-controlled with various analgesia.        PLAN  I Discussed this case with Dr Garcia and he is agreeable this plan of care.      Patient has been scheduled for Left Ureteroscopy laser lithotripsy possible left stent on on Wednesday 08/09/2022 with Dr. Garcia    Patient have been given adequate narcotic pain medication hydrocodone 10/325 to utilize for severe pain.    He has been given nonnarcotic pain medication and advised to continue with Toradol for breakthrough pain as needed.  Also nausea medicine, Flomax for medical expulsive therapy     We discussed treatment options for her flank pain with patient encouraged to continue conservative therapy alternating NSAID/Tylenol as tolerated, Also including hot baths, showers, warm compresses to lower back as tolerated. Also encouraged walking, physical therapy, light exercises as tolerated    Rest is the most important treatment in the case of flank pain. Rest and physical therapy are enough to improve minor pain. Discussed to monitor her daily routine with prevention of flank pain by: At least Drinking 8 glasses of water per day, Limiting your alcohol consumption.  Having a healthy diet containing fruits, vegetables, and a lot of fluids, Practicing safe sex.  Also maintaining proper hygiene of your body as well as the environment.    Narcotic pain medication-The Patient has significant acute pain that I believe would be an indication for the use of narcotic pain medication.  I discussed the significant risks of pain medication and the fact that this will be a short only option and I will give her no more than a three-day supply of pain medication.  And I will not plan long-term medication and that  PATIENT will be sent to a pain clinic if at all becomes necessary.      We discussed signing a pain medication agreement and the fact that we're going to run a [facility] review to be sure the patient is not getting pain medication from elsewhere.  If  this is the case we will not give pain medication.  As part of the patient's treatment plan if they are being prescribed a controlled substance with potential for abuse.      This patient has been made aware of the appropriate dose of such medications including, the risk for somnolence, limited ability to drive and/or safety and the significant potential for overdose.  It has been made clear that these medications are for the prescribed patient only without concomitant use of alcohol or other CONTROLLED substances unless prescribed by the medical provider.  THE PATIENT Has completed prescribing agreement detailing the terms TO continue prescribing him a controlled substance,  Including monitoring JILL REPORTS, the possibility of urine drug screens and PILL Counts. The patient is aware that we review JILL reports on a regular basis and scan them into the chart.  History and physical examination PORTRAYED continued safe and appropriate use of controlled substances.    FINALLY, Also discussed the fact that the new [place] legislation allows only a THREE-DAY prescription for pain medication.  In this situation he will be referred to a chronic pain clinic if need be .    We will follow-up in clinic postprocedure, he may return sooner if need be    THE PATIENT IS AGREEABLE WITH PLAN OF CARE.    Smoking Cessation Counseling:  Never a smoker.  Patient does not currently use any tobacco products.     Follow Up   Return for Next scheduled follow up.    Patient was given instructions and counseling regarding his condition or for health maintenance advice. Please see specific information pulled into the AVS if appropriate.          This document has been electronically signed by Griselda Cheng-Akwa, APRN   August 9, 2022 23:38 EDT      Dictated Utilizing Dragon Dictation: Part of this note may be an electronic transcription/translation of spoken language to printed text using the Dragon Dictation System.

## 2022-08-09 NOTE — PATIENT INSTRUCTIONS
Discussed a kidney stone prevention diet to include increasing p.o. fluid intake, to at least 1 to 2 L of water daily.  She is to avoid caffeine products such as cola, coffee, and to avoid soft or soda drinks.  She is to decrease her sodium consumption as in  Fast foods, damon, salted nuts, canned foods, and smoked/cured foods. She is also to decrease her oxalate consumption, as in spinach, Francisco greens, and Rhubarb.  Also important is to decrease protein intake, as in red meats, peanut butter, and also avoid nuts.

## 2022-08-09 NOTE — PROGRESS NOTES
Chief Complaint  Nephrolithiasis and MULTIPLE LEFT UVJ STONES/BILATERAL NEPHROLITHIASIS/FLANK/AB (ER FOLLOW UP LEFT UVJ STONES/LLQ PAIN)    Uriah Schulz presents to [facility] for MULTIPLE LEFT UVJ STONES/LLQ PAIN/FLANK PAIN  History of Present Illness    Mr. LAURA SCHULZ is a pleasant 47 year-old patient, with a significant history of recurrent kidney stones, well-known to Dr. Garcia, who returns to clinic today for evaluation with concerns for kidney stones. Reports his last ESWL was 03/25/22    THE Patient reports he presented to [facility] on Sunday 08/7/2022 morning , secondary to very sharp 10/10, aching and very consistent Left flank pain/left lower quadrant abdominal pain, radiating to his suprapubic region, causing him significant nausea with vomiting, pelvic pain/discomfort.  Patient reports he has significant testalgia, urinary symptoms of frequency urgency with burning on urination.    He had a CT scan abdomen and pelvis kidney stone protocol done which showed: There is moderate hydronephrosis of the left kidney secondary to a grouping of at least 4 medium to large stones in the distal left ureter, the largest 1 measures almost a centimeter.  There are 2 smaller stones in the lower pole of the left kidney, there is a 3 mm stone in the midpole of the right kidney, with a probable cyst in the upper pole of the left kidney measuring several centimeters.  No additional stones within the urinary bladder, but extensive calcification within the prostate.    On presentation to clinic, he is in  apparent discomfort 10/10.  He still reports left  flank pain that is still very colicky, but reports increased difficulty urinating, and finding any comfortable sitting/lying position.  He has left lower quadrant pain, and reports pressure/discomfort still in his left lower abdomen and pelvic region.  He also has testicular discomfort, and  "a sensation of \"being kicked in his groin\".       HE also reports urinary symptoms of frequency, urgency, but denies dysuria, burning on urination and gross hematuria.  He is currently on ciprofloxacin given at the ER, HE Denies nausea, no vomiting, denies chills or fevers. HIS Urine dipstick today is completely negative for any infection, IT is negative for gross/microscopic hematuria.  His IPSS score is 15, PVR is 0.    He is relatively healthy with no significant PMHx besides listed below.  We discussed getting a CT scan, and scrotal ultrasound due to his persistent discomfort    Objective   Vital Signs:   Ht 175.3 cm (69.02\")   Wt 94.3 kg (208 lb)   BMI 30.70 kg/m²       ROS:   Review of Systems   Constitutional: Positive for activity change, appetite change and fatigue. Negative for chills, diaphoresis, fever, unexpected weight gain and unexpected weight loss.   HENT: Negative for congestion, ear discharge, ear pain, nosebleeds, rhinorrhea, sinus pressure and sore throat.    Eyes: Negative for blurred vision, double vision, photophobia, pain, redness and visual disturbance.   Respiratory: Negative for apnea, cough, chest tightness, shortness of breath, wheezing and stridor.    Cardiovascular: Negative for chest pain and palpitations.   Gastrointestinal: Positive for abdominal pain. Negative for abdominal distention, constipation, diarrhea, nausea and vomiting.   Endocrine: Negative for polydipsia, polyphagia and polyuria.   Genitourinary: Positive for dysuria, flank pain, frequency, hematuria and urgency. Negative for decreased urine volume, difficulty urinating, discharge, genital sores, penile pain, penile swelling, scrotal swelling, testicular pain and urinary incontinence.   Musculoskeletal: Positive for back pain. Negative for arthralgias and joint swelling.   Skin: Positive for pallor. Negative for rash and wound.   Neurological: Negative for dizziness, tremors, syncope, weakness, light-headedness, " memory problem and confusion.   Psychiatric/Behavioral: Positive for positive for hyperactivity and stress. Negative for agitation, behavioral problems and decreased concentration. The patient is nervous/anxious.         Physical Exam  Constitutional:       General: He is in acute distress.      Appearance: He is well-developed. He is obese. He is ill-appearing.   HENT:      Head: Normocephalic and atraumatic.      Right Ear: External ear normal.      Left Ear: External ear normal.   Eyes:      General:         Right eye: No discharge.         Left eye: No discharge.      Conjunctiva/sclera: Conjunctivae normal.      Pupils: Pupils are equal, round, and reactive to light.   Neck:      Thyroid: No thyromegaly.      Trachea: No tracheal deviation.   Cardiovascular:      Rate and Rhythm: Normal rate and regular rhythm.      Heart sounds: No murmur heard.    No friction rub.   Pulmonary:      Effort: Pulmonary effort is normal. No respiratory distress.      Breath sounds: Normal breath sounds. No stridor.   Abdominal:      General: Bowel sounds are normal. There is no distension.      Palpations: Abdomen is soft.      Tenderness: There is abdominal tenderness. There is no guarding.   Genitourinary:     Penis: Normal and uncircumcised. No tenderness or discharge.       Testes: Normal.      Rectum: Normal. Guaiac result negative.      Comments: Reports urinary frequency, dysuria,   Musculoskeletal:         General: Tenderness present. No deformity. Normal range of motion.      Cervical back: Normal range of motion and neck supple.   Skin:     General: Skin is warm and dry.      Capillary Refill: Capillary refill takes less than 2 seconds.      Coloration: Skin is pale.   Neurological:      Mental Status: He is alert and oriented to person, place, and time.      Cranial Nerves: No cranial nerve deficit.      Coordination: Coordination normal.   Psychiatric:         Behavior: Behavior normal.         Thought Content:  Thought content normal.         Judgment: Judgment normal.        Result Review :            Assessment and Plan    Problem List Items Addressed This Visit        Gastrointestinal Abdominal     Left lower quadrant abdominal pain    Overview     Added automatically from request for surgery [id number]         Relevant Orders    Case Request (Completed)    COVID PRE-OP / PRE-PROCEDURE SCREENING ORDER (NO ISOLATION) - Swab, Nasopharynx    CBC (No Diff) (Completed)    Comprehensive Metabolic Panel (Completed)    Bilateral flank pain    Overview     Added automatically from request for surgery [id number]         Relevant Orders    Case Request (Completed)    COVID PRE-OP / PRE-PROCEDURE SCREENING ORDER (NO ISOLATION) - Swab, Nasopharynx    CBC (No Diff) (Completed)    Comprehensive Metabolic Panel (Completed)       Genitourinary and Reproductive     Left ureteral stone - Primary    Overview     Added automatically from request for surgery [id number]         Relevant Medications    HYDROcodone-acetaminophen (Norco)  MG per tablet    Other Relevant Orders    Case Request (Completed)    COVID PRE-OP / PRE-PROCEDURE SCREENING ORDER (NO ISOLATION) - Swab, Nasopharynx    CBC (No Diff) (Completed)    Comprehensive Metabolic Panel (Completed)          Patient reports that he is not currently experiencing any symptoms of urinary incontinence.    BMI is >= 30 and <35. (Class 1 Obesity). The following options were offered after discussion;: weight loss educational material (shared in after visit summary), exercise counseling/recommendations and nutrition counseling/recommendations      RADIOLOGY (CT AND/OR KUB):    CT Abdomen and Pelvis: No results found for this or any previous visit.     CT Stone Protocol: No results found for this or any previous visit.     KUB: Results for orders placed during the hospital encounter of 03/21/22    XR Abdomen KUB    Narrative  EXAM:  XR Abdomen, 1 View    EXAM DATE:  3/21/2022 9:38 AM    CLINICAL  HISTORY:  Left-sided flank pain with kidney stone    TECHNIQUE:  Frontal supine view of the abdomen/pelvis.    COMPARISON:  03/19/2022    FINDINGS:  Gastrointestinal tract:  Mild constipation.  No dilation.  Organs:  12.4 mm stone in the region of the proximal left ureter is  stable.  Nonobstructing left kidney stones again noted.  Bones/joints:  Unremarkable.    Impression  1.  No change in location of a 12 mm left ureteral stone.  2.  Other nonobstructing left kidney stones again noted.    This report was finalized on 3/21/2022 10:04 AM by Dr. Yazan Kat MD.       LABS (3 MONTHS):    Pre-Admission Testing on 08/09/2022   Component Date Value Ref Range Status   • WBC 08/09/2022 4.58  3.40 - 10.80 10*3/mm3 Final   • RBC 08/09/2022 4.41  4.14 - 5.80 10*6/mm3 Final   • Hemoglobin 08/09/2022 14.5  13.0 - 17.7 g/dL Final   • Hematocrit 08/09/2022 42.6  37.5 - 51.0 % Final   • MCV 08/09/2022 96.6  79.0 - 97.0 fL Final   • MCH 08/09/2022 32.9  26.6 - 33.0 pg Final   • MCHC 08/09/2022 34.0  31.5 - 35.7 g/dL Final   • RDW 08/09/2022 12.9  12.3 - 15.4 % Final   • RDW-SD 08/09/2022 45.8  37.0 - 54.0 fl Final   • MPV 08/09/2022 9.6  6.0 - 12.0 fL Final   • Platelets 08/09/2022 167  140 - 450 10*3/mm3 Final   • Glucose 08/09/2022 104 (A) 65 - 99 mg/dL Final   • BUN 08/09/2022 10  6 - 20 mg/dL Final   • Creatinine 08/09/2022 0.93  0.76 - 1.27 mg/dL Final   • Sodium 08/09/2022 139  136 - 145 mmol/L Final   • Potassium 08/09/2022 3.9  3.5 - 5.2 mmol/L Final   • Chloride 08/09/2022 102  98 - 107 mmol/L Final   • CO2 08/09/2022 28.1  22.0 - 29.0 mmol/L Final   • Calcium 08/09/2022 9.2  8.6 - 10.5 mg/dL Final   • Total Protein 08/09/2022 6.6  6.0 - 8.5 g/dL Final   • Albumin 08/09/2022 4.19  3.50 - 5.20 g/dL Final   • ALT (SGPT) 08/09/2022 19  1 - 41 U/L Final   • AST (SGOT) 08/09/2022 17  1 - 40 U/L Final   • Alkaline Phosphatase 08/09/2022 70  39 - 117 U/L Final   • Total Bilirubin 08/09/2022 0.3  0.0 - 1.2 mg/dL Final   •  Globulin 08/09/2022 2.4  gm/dL Final   • A/G Ratio 08/09/2022 1.7  g/dL Final   • BUN/Creatinine Ratio 08/09/2022 10.8  7.0 - 25.0 Final   • Anion Gap 08/09/2022 8.9  5.0 - 15.0 mmol/L Final   • eGFR 08/09/2022 100.7  >60.0 mL/min/1.73 Final    National Kidney Foundation and American Society of Nephrology (ASN) Task Force recommended calculation based on the Chronic Kidney Disease Epidemiology Collaboration (CKD-EPI) equation refit without adjustment for race.          ASSESSMENT  LEFT UVJ STONE/LLQ ABD. PAIN/ BILATERAL NEPHROLITHIASIS/FLANK PAIN  Mr. LAURA SCHULZ is a pleasant 47 year-old patient, with a significant history of recurrent kidney stones, well-known to Dr. Garcia, who returns to clinic today for evaluation with concerns for kidney stones.  He is an ER follow-up from [facility].    Left Ureteral Calculus/FLANK PAIN/DYSURIA:The Patient has been diagnosed with multiple 3MM-1 CM Left ureteral calculus.  He presents to clinic today with left flank pain that is colicky in nature, very persistent and has become very bothersome to him.  He has had 2 ER visits in the last week, he is desirous of surgical intervention at this point.     We have discussed the various parameters regarding spontaneous passage including the notion that a tiny 1 -4 mm stone like he has, has a 95% high likelihood of spontaneous passage versus a larger stone >1CM like he has, being caught up in the upper areas of the urinary tract. We also discussed the medical management of stone disease and the use of medical expulsive therapy in the form of Flomax. This is used in an off label setting. I also talked about nonoperative management including ambulation and increasing fluids and hot tub as being an effective adjuncts in the treatment of a ureteral stone. We discussed the indicators for intervention including  absolute indicators such as sepsis and uncontrollable severe pain as well as  the relative indicators of moderate  pain that is well-controlled with various analgesia.        PLAN  I Discussed this case with Dr Garcia and he is agreeable this plan of care.      Patient has been scheduled for Left Ureteroscopy laser lithotripsy possible left stent on on Wednesday 08/09/2022 with Dr. Garcia    Patient have been given adequate narcotic pain medication hydrocodone 10/325 to utilize for severe pain.    He has been given nonnarcotic pain medication and advised to continue with Toradol for breakthrough pain as needed.  Also nausea medicine, Flomax for medical expulsive therapy     We discussed treatment options for her flank pain with patient encouraged to continue conservative therapy alternating NSAID/Tylenol as tolerated, Also including hot baths, showers, warm compresses to lower back as tolerated. Also encouraged walking, physical therapy, light exercises as tolerated    Rest is the most important treatment in the case of flank pain. Rest and physical therapy are enough to improve minor pain. Discussed to monitor her daily routine with prevention of flank pain by: At least Drinking 8 glasses of water per day, Limiting your alcohol consumption.  Having a healthy diet containing fruits, vegetables, and a lot of fluids, Practicing safe sex.  Also maintaining proper hygiene of your body as well as the environment.    Narcotic pain medication-The Patient has significant acute pain that I believe would be an indication for the use of narcotic pain medication.  I discussed the significant risks of pain medication and the fact that this will be a short only option and I will give her no more than a three-day supply of pain medication.  And I will not plan long-term medication and that  PATIENT will be sent to a pain clinic if at all becomes necessary.      We discussed signing a pain medication agreement and the fact that we're going to run a [facility] review to be sure the patient is not getting pain medication from elsewhere.  If  this is the case we will not give pain medication.  As part of the patient's treatment plan if they are being prescribed a controlled substance with potential for abuse.      This patient has been made aware of the appropriate dose of such medications including, the risk for somnolence, limited ability to drive and/or safety and the significant potential for overdose.  It has been made clear that these medications are for the prescribed patient only without concomitant use of alcohol or other CONTROLLED substances unless prescribed by the medical provider.  THE PATIENT Has completed prescribing agreement detailing the terms TO continue prescribing him a controlled substance,  Including monitoring JILL REPORTS, the possibility of urine drug screens and PILL Counts. The patient is aware that we review JILL reports on a regular basis and scan them into the chart.  History and physical examination PORTRAYED continued safe and appropriate use of controlled substances.    FINALLY, Also discussed the fact that the new [place] legislation allows only a THREE-DAY prescription for pain medication.  In this situation he will be referred to a chronic pain clinic if need be .    We will follow-up in clinic postprocedure, he may return sooner if need be    THE PATIENT IS AGREEABLE WITH PLAN OF CARE.    Smoking Cessation Counseling:  Never a smoker.  Patient does not currently use any tobacco products.     Follow Up   Return for Next scheduled follow up.    Patient was given instructions and counseling regarding his condition or for health maintenance advice. Please see specific information pulled into the AVS if appropriate.          This document has been electronically signed by Griselda Cheng-Akwa, APRN   August 9, 2022 23:38 EDT      Dictated Utilizing Dragon Dictation: Part of this note may be an electronic transcription/translation of spoken language to printed text using the Dragon Dictation System.

## 2022-08-09 NOTE — ED NOTES
"Patient approached  states, \"I'm not going to wait any longer.\" Patient refused to wait for Nurse to print AMA documentation. Notified Provider/Lead RN. Patient not available to sign AMA document. No iv access established during this hospital visit.   "

## 2022-08-09 NOTE — DISCHARGE INSTRUCTIONS

## 2022-08-10 ENCOUNTER — ANESTHESIA (OUTPATIENT)
Dept: PERIOP | Facility: HOSPITAL | Age: 47
End: 2022-08-10

## 2022-08-10 ENCOUNTER — ANESTHESIA EVENT (OUTPATIENT)
Dept: PERIOP | Facility: HOSPITAL | Age: 47
End: 2022-08-10

## 2022-08-10 ENCOUNTER — HOSPITAL ENCOUNTER (OUTPATIENT)
Facility: HOSPITAL | Age: 47
Setting detail: HOSPITAL OUTPATIENT SURGERY
Discharge: HOME | End: 2022-08-10
Attending: UROLOGY | Admitting: UROLOGY

## 2022-08-10 ENCOUNTER — APPOINTMENT (OUTPATIENT)
Dept: GENERAL RADIOLOGY | Facility: HOSPITAL | Age: 47
End: 2022-08-10

## 2022-08-10 VITALS
BODY MASS INDEX: 30.9 KG/M2 | SYSTOLIC BLOOD PRESSURE: 130 MMHG | HEART RATE: 70 BPM | DIASTOLIC BLOOD PRESSURE: 65 MMHG | HEIGHT: 69 IN | OXYGEN SATURATION: 95 % | WEIGHT: 208.6 LBS | TEMPERATURE: 97.6 F | RESPIRATION RATE: 18 BRPM

## 2022-08-10 DIAGNOSIS — R10.9 BILATERAL FLANK PAIN: ICD-10-CM

## 2022-08-10 DIAGNOSIS — N20.1 LEFT URETERAL STONE: ICD-10-CM

## 2022-08-10 DIAGNOSIS — R10.32 LEFT LOWER QUADRANT ABDOMINAL PAIN: ICD-10-CM

## 2022-08-10 DIAGNOSIS — N20.0 RENAL CALCULUS, LEFT: Primary | ICD-10-CM

## 2022-08-10 PROCEDURE — 25010000002 GENTAMICIN PER 80 MG: Performed by: NURSE PRACTITIONER

## 2022-08-10 PROCEDURE — 82365 CALCULUS SPECTROSCOPY: CPT | Performed by: UROLOGY

## 2022-08-10 PROCEDURE — 25010000002 FENTANYL CITRATE (PF) 50 MCG/ML SOLUTION: Performed by: NURSE ANESTHETIST, CERTIFIED REGISTERED

## 2022-08-10 PROCEDURE — 25010000002 IOPAMIDOL 61 % SOLUTION: Performed by: UROLOGY

## 2022-08-10 PROCEDURE — 76000 FLUOROSCOPY <1 HR PHYS/QHP: CPT

## 2022-08-10 PROCEDURE — 52310 CYSTOSCOPY AND TREATMENT: CPT | Performed by: UROLOGY

## 2022-08-10 PROCEDURE — 76000 FLUOROSCOPY <1 HR PHYS/QHP: CPT | Performed by: RADIOLOGY

## 2022-08-10 PROCEDURE — 25010000002 PROPOFOL 10 MG/ML EMULSION: Performed by: NURSE ANESTHETIST, CERTIFIED REGISTERED

## 2022-08-10 PROCEDURE — 25010000002 ONDANSETRON PER 1 MG: Performed by: NURSE ANESTHETIST, CERTIFIED REGISTERED

## 2022-08-10 PROCEDURE — 25010000002 MIDAZOLAM PER 1 MG: Performed by: NURSE ANESTHETIST, CERTIFIED REGISTERED

## 2022-08-10 RX ORDER — ONDANSETRON 2 MG/ML
4 INJECTION INTRAMUSCULAR; INTRAVENOUS AS NEEDED
Status: DISCONTINUED | OUTPATIENT
Start: 2022-08-10 | End: 2022-08-10 | Stop reason: HOSPADM

## 2022-08-10 RX ORDER — PROPOFOL 10 MG/ML
VIAL (ML) INTRAVENOUS AS NEEDED
Status: DISCONTINUED | OUTPATIENT
Start: 2022-08-10 | End: 2022-08-10 | Stop reason: SURG

## 2022-08-10 RX ORDER — MAGNESIUM HYDROXIDE 1200 MG/15ML
LIQUID ORAL AS NEEDED
Status: DISCONTINUED | OUTPATIENT
Start: 2022-08-10 | End: 2022-08-10 | Stop reason: HOSPADM

## 2022-08-10 RX ORDER — OXYCODONE HYDROCHLORIDE AND ACETAMINOPHEN 5; 325 MG/1; MG/1
1 TABLET ORAL ONCE AS NEEDED
Status: DISCONTINUED | OUTPATIENT
Start: 2022-08-10 | End: 2022-08-10 | Stop reason: HOSPADM

## 2022-08-10 RX ORDER — IPRATROPIUM BROMIDE AND ALBUTEROL SULFATE 2.5; .5 MG/3ML; MG/3ML
3 SOLUTION RESPIRATORY (INHALATION) ONCE AS NEEDED
Status: DISCONTINUED | OUTPATIENT
Start: 2022-08-10 | End: 2022-08-10 | Stop reason: HOSPADM

## 2022-08-10 RX ORDER — SODIUM CHLORIDE, SODIUM LACTATE, POTASSIUM CHLORIDE, CALCIUM CHLORIDE 600; 310; 30; 20 MG/100ML; MG/100ML; MG/100ML; MG/100ML
INJECTION, SOLUTION INTRAVENOUS CONTINUOUS PRN
Status: DISCONTINUED | OUTPATIENT
Start: 2022-08-10 | End: 2022-08-10 | Stop reason: SURG

## 2022-08-10 RX ORDER — SODIUM CHLORIDE, SODIUM LACTATE, POTASSIUM CHLORIDE, CALCIUM CHLORIDE 600; 310; 30; 20 MG/100ML; MG/100ML; MG/100ML; MG/100ML
125 INJECTION, SOLUTION INTRAVENOUS ONCE
Status: COMPLETED | OUTPATIENT
Start: 2022-08-10 | End: 2022-08-10

## 2022-08-10 RX ORDER — MIDAZOLAM HYDROCHLORIDE 1 MG/ML
INJECTION INTRAMUSCULAR; INTRAVENOUS AS NEEDED
Status: DISCONTINUED | OUTPATIENT
Start: 2022-08-10 | End: 2022-08-10 | Stop reason: SURG

## 2022-08-10 RX ORDER — MIDAZOLAM HYDROCHLORIDE 1 MG/ML
1 INJECTION INTRAMUSCULAR; INTRAVENOUS
Status: DISCONTINUED | OUTPATIENT
Start: 2022-08-10 | End: 2022-08-10 | Stop reason: HOSPADM

## 2022-08-10 RX ORDER — SODIUM CHLORIDE 0.9 % (FLUSH) 0.9 %
10 SYRINGE (ML) INJECTION AS NEEDED
Status: DISCONTINUED | OUTPATIENT
Start: 2022-08-10 | End: 2022-08-10 | Stop reason: HOSPADM

## 2022-08-10 RX ORDER — GENTAMICIN SULFATE 80 MG/100ML
80 INJECTION, SOLUTION INTRAVENOUS ONCE
Status: COMPLETED | OUTPATIENT
Start: 2022-08-10 | End: 2022-08-10

## 2022-08-10 RX ORDER — SODIUM CHLORIDE, SODIUM LACTATE, POTASSIUM CHLORIDE, CALCIUM CHLORIDE 600; 310; 30; 20 MG/100ML; MG/100ML; MG/100ML; MG/100ML
100 INJECTION, SOLUTION INTRAVENOUS ONCE AS NEEDED
Status: DISCONTINUED | OUTPATIENT
Start: 2022-08-10 | End: 2022-08-10 | Stop reason: HOSPADM

## 2022-08-10 RX ORDER — SODIUM CHLORIDE 0.9 % (FLUSH) 0.9 %
10 SYRINGE (ML) INJECTION EVERY 12 HOURS SCHEDULED
Status: DISCONTINUED | OUTPATIENT
Start: 2022-08-10 | End: 2022-08-10 | Stop reason: HOSPADM

## 2022-08-10 RX ORDER — ONDANSETRON 2 MG/ML
INJECTION INTRAMUSCULAR; INTRAVENOUS AS NEEDED
Status: DISCONTINUED | OUTPATIENT
Start: 2022-08-10 | End: 2022-08-10 | Stop reason: SURG

## 2022-08-10 RX ORDER — KETOROLAC TROMETHAMINE 30 MG/ML
30 INJECTION, SOLUTION INTRAMUSCULAR; INTRAVENOUS EVERY 6 HOURS PRN
Status: DISCONTINUED | OUTPATIENT
Start: 2022-08-10 | End: 2022-08-10 | Stop reason: HOSPADM

## 2022-08-10 RX ORDER — FENTANYL CITRATE 50 UG/ML
INJECTION, SOLUTION INTRAMUSCULAR; INTRAVENOUS AS NEEDED
Status: DISCONTINUED | OUTPATIENT
Start: 2022-08-10 | End: 2022-08-10 | Stop reason: SURG

## 2022-08-10 RX ORDER — FAMOTIDINE 10 MG/ML
INJECTION, SOLUTION INTRAVENOUS AS NEEDED
Status: DISCONTINUED | OUTPATIENT
Start: 2022-08-10 | End: 2022-08-10 | Stop reason: SURG

## 2022-08-10 RX ORDER — MEPERIDINE HYDROCHLORIDE 25 MG/ML
12.5 INJECTION INTRAMUSCULAR; INTRAVENOUS; SUBCUTANEOUS
Status: DISCONTINUED | OUTPATIENT
Start: 2022-08-10 | End: 2022-08-10 | Stop reason: HOSPADM

## 2022-08-10 RX ORDER — FENTANYL CITRATE 50 UG/ML
50 INJECTION, SOLUTION INTRAMUSCULAR; INTRAVENOUS
Status: DISCONTINUED | OUTPATIENT
Start: 2022-08-10 | End: 2022-08-10 | Stop reason: HOSPADM

## 2022-08-10 RX ORDER — OXYCODONE AND ACETAMINOPHEN 10; 325 MG/1; MG/1
1 TABLET ORAL EVERY 4 HOURS PRN
Qty: 12 TABLET | Refills: 0 | Status: SHIPPED | OUTPATIENT
Start: 2022-08-10 | End: 2022-09-12 | Stop reason: SDUPTHER

## 2022-08-10 RX ADMIN — FENTANYL CITRATE 100 MCG: 50 INJECTION INTRAMUSCULAR; INTRAVENOUS at 08:17

## 2022-08-10 RX ADMIN — ONDANSETRON 4 MG: 2 INJECTION INTRAMUSCULAR; INTRAVENOUS at 08:17

## 2022-08-10 RX ADMIN — PROPOFOL 200 MG: 10 INJECTION, EMULSION INTRAVENOUS at 08:22

## 2022-08-10 RX ADMIN — SODIUM CHLORIDE, POTASSIUM CHLORIDE, SODIUM LACTATE AND CALCIUM CHLORIDE 125 ML/HR: 600; 310; 30; 20 INJECTION, SOLUTION INTRAVENOUS at 07:32

## 2022-08-10 RX ADMIN — GENTAMICIN SULFATE 80 MG: 80 INJECTION, SOLUTION INTRAVENOUS at 08:17

## 2022-08-10 RX ADMIN — FAMOTIDINE 20 MG: 10 INJECTION INTRAVENOUS at 08:17

## 2022-08-10 RX ADMIN — MIDAZOLAM HYDROCHLORIDE 2 MG: 1 INJECTION, SOLUTION INTRAMUSCULAR; INTRAVENOUS at 08:17

## 2022-08-10 RX ADMIN — SODIUM CHLORIDE, POTASSIUM CHLORIDE, SODIUM LACTATE AND CALCIUM CHLORIDE: 600; 310; 30; 20 INJECTION, SOLUTION INTRAVENOUS at 08:17

## 2022-08-10 NOTE — ANESTHESIA PROCEDURE NOTES
Airway  Urgency: elective    Date/Time: 8/10/2022 8:23 AM  Airway not difficult    General Information and Staff    Patient location during procedure: OR  Anesthesiologist: Chepe Rdz MD  CRNA/CAA: Segundo Pratt CRNA    Indications and Patient Condition    Preoxygenated: yes  MILS not maintained throughout  Mask difficulty assessment: 0 - not attempted    Final Airway Details  Final airway type: supraglottic airway      Successful airway: unique  Size 4    Number of attempts at approach: 1  Assessment: lips, teeth, and gum same as pre-op and atraumatic intubation    Additional Comments  Atraumatic LMA placement, dentition unchanged.

## 2022-08-10 NOTE — ANESTHESIA POSTPROCEDURE EVALUATION
Patient: Dorita Conte    Procedure Summary     Date: 08/10/22 Room / Location: [facility] OR [address] [place] [address] OR    Anesthesia Start: 0817 Anesthesia Stop: 0852    Procedure: URETEROSCOPY, RETROGRADE PYELOGRAM (Left ) Diagnosis:       Left ureteral stone      Left lower quadrant abdominal pain      Bilateral flank pain      (Left ureteral stone [N20.1])      (Left lower quadrant abdominal pain [R10.32])      (Bilateral flank pain [R10.9])    Surgeons: Eric Garcia MD Provider: Chepe Rdz MD    Anesthesia Type: general ASA Status: 2          Anesthesia Type: general    Vitals  Vitals Value Taken Time   /90 08/10/22 0923   Temp 99 °F (37.2 °C) 08/10/22 0853   Pulse 66 08/10/22 0923   Resp 14 08/10/22 0923   SpO2 95 % 08/10/22 0923           Post Anesthesia Care and Evaluation    Patient location during evaluation: PHASE II  Patient participation: complete - patient participated  Level of consciousness: awake and alert  Pain score: 1  Pain management: adequate    Airway patency: patent  Anesthetic complications: No anesthetic complications  PONV Status: controlled  Cardiovascular status: acceptable  Respiratory status: acceptable  Hydration status: acceptable       Tetracycline Counseling: Patient counseled regarding possible photosensitivity and increased risk for sunburn.  Patient instructed to avoid sunlight, if possible.  When exposed to sunlight, patients should wear protective clothing, sunglasses, and sunscreen.  The patient was instructed to call the office immediately if the following severe adverse effects occur:  hearing changes, easy bruising/bleeding, severe headache, or vision changes.  The patient verbalized understanding of the proper use and possible adverse effects of tetracycline.  All of the patient's questions and concerns were addressed. Patient understands to avoid pregnancy while on therapy due to potential birth defects. Doxycycline Pregnancy And Lactation Text: This medication is Pregnancy Category D and not consider safe during pregnancy. It is also excreted in breast milk but is considered safe for shorter treatment courses. Topical Clindamycin Pregnancy And Lactation Text: This medication is Pregnancy Category B and is considered safe during pregnancy. It is unknown if it is excreted in breast milk. Sarecycline Counseling: Patient advised regarding possible photosensitivity and discoloration of the teeth, skin, lips, tongue and gums.  Patient instructed to avoid sunlight, if possible.  When exposed to sunlight, patients should wear protective clothing, sunglasses, and sunscreen.  The patient was instructed to call the office immediately if the following severe adverse effects occur:  hearing changes, easy bruising/bleeding, severe headache, or vision changes.  The patient verbalized understanding of the proper use and possible adverse effects of sarecycline.  All of the patient's questions and concerns were addressed. Birth Control Pills Pregnancy And Lactation Text: This medication should be avoided if pregnant and for the first 30 days post-partum. Use Enhanced Medication Counseling?: No Topical Retinoid Pregnancy And Lactation Text: This medication is Pregnancy Category C. It is unknown if this medication is excreted in breast milk. High Dose Vitamin A Counseling: Side effects reviewed, pt to contact office should one occur. Azithromycin Pregnancy And Lactation Text: This medication is considered safe during pregnancy and is also secreted in breast milk. Tetracycline Pregnancy And Lactation Text: This medication is Pregnancy Category D and not consider safe during pregnancy. It is also excreted in breast milk. Erythromycin Counseling:  I discussed with the patient the risks of erythromycin including but not limited to GI upset, allergic reaction, drug rash, diarrhea, increase in liver enzymes, and yeast infections. Dapsone Counseling: I discussed with the patient the risks of dapsone including but not limited to hemolytic anemia, agranulocytosis, rashes, methemoglobinemia, kidney failure, peripheral neuropathy, headaches, GI upset, and liver toxicity.  Patients who start dapsone require monitoring including baseline LFTs and weekly CBCs for the first month, then every month thereafter.  The patient verbalized understanding of the proper use and possible adverse effects of dapsone.  All of the patient's questions and concerns were addressed. High Dose Vitamin A Pregnancy And Lactation Text: High dose vitamin A therapy is contraindicated during pregnancy and breast feeding. Bactrim Counseling:  I discussed with the patient the risks of sulfa antibiotics including but not limited to GI upset, allergic reaction, drug rash, diarrhea, dizziness, photosensitivity, and yeast infections.  Rarely, more serious reactions can occur including but not limited to aplastic anemia, agranulocytosis, methemoglobinemia, blood dyscrasias, liver or kidney failure, lung infiltrates or desquamative/blistering drug rashes. Topical Sulfur Applications Counseling: Topical Sulfur Counseling: Patient counseled that this medication may cause skin irritation or allergic reactions.  In the event of skin irritation, the patient was advised to reduce the amount of the drug applied or use it less frequently.   The patient verbalized understanding of the proper use and possible adverse effects of topical sulfur application.  All of the patient's questions and concerns were addressed. Tazorac Counseling:  Patient advised that medication is irritating and drying.  Patient may need to apply sparingly and wash off after an hour before eventually leaving it on overnight.  The patient verbalized understanding of the proper use and possible adverse effects of tazorac.  All of the patient's questions and concerns were addressed. Benzoyl Peroxide Counseling: Patient counseled that medicine may cause skin irritation and bleach clothing.  In the event of skin irritation, the patient was advised to reduce the amount of the drug applied or use it less frequently.   The patient verbalized understanding of the proper use and possible adverse effects of benzoyl peroxide.  All of the patient's questions and concerns were addressed. Dapsone Pregnancy And Lactation Text: This medication is Pregnancy Category C and is not considered safe during pregnancy or breast feeding. Erythromycin Pregnancy And Lactation Text: This medication is Pregnancy Category B and is considered safe during pregnancy. It is also excreted in breast milk. Bactrim Pregnancy And Lactation Text: This medication is Pregnancy Category D and is known to cause fetal risk.  It is also excreted in breast milk. Topical Sulfur Applications Pregnancy And Lactation Text: This medication is Pregnancy Category C and has an unknown safety profile during pregnancy. It is unknown if this topical medication is excreted in breast milk. Spironolactone Counseling: Patient advised regarding risks of diarrhea, abdominal pain, hyperkalemia, birth defects (for female patients), liver toxicity and renal toxicity. The patient may need blood work to monitor liver and kidney function and potassium levels while on therapy. The patient verbalized understanding of the proper use and possible adverse effects of spironolactone.  All of the patient's questions and concerns were addressed. Tazorac Pregnancy And Lactation Text: This medication is not safe during pregnancy. It is unknown if this medication is excreted in breast milk. Minocycline Counseling: Patient advised regarding possible photosensitivity and discoloration of the teeth, skin, lips, tongue and gums.  Patient instructed to avoid sunlight, if possible.  When exposed to sunlight, patients should wear protective clothing, sunglasses, and sunscreen.  The patient was instructed to call the office immediately if the following severe adverse effects occur:  hearing changes, easy bruising/bleeding, severe headache, or vision changes.  The patient verbalized understanding of the proper use and possible adverse effects of minocycline.  All of the patient's questions and concerns were addressed. Benzoyl Peroxide Pregnancy And Lactation Text: This medication is Pregnancy Category C. It is unknown if benzoyl peroxide is excreted in breast milk. Isotretinoin Counseling: Patient should get monthly blood tests, not donate blood, not drive at night if vision affected, not share medication, and not undergo elective surgery for 6 months after tx completed. Side effects reviewed, pt to contact office should one occur. Spironolactone Pregnancy And Lactation Text: This medication can cause feminization of the male fetus and should be avoided during pregnancy. The active metabolite is also found in breast milk. Birth Control Pills Counseling: Birth Control Pill Counseling: I discussed with the patient the potential side effects of OCPs including but not limited to increased risk of stroke, heart attack, thrombophlebitis, deep venous thrombosis, hepatic adenomas, breast changes, GI upset, headaches, and depression.  The patient verbalized understanding of the proper use and possible adverse effects of OCPs. All of the patient's questions and concerns were addressed. Doxycycline Counseling:  Patient counseled regarding possible photosensitivity and increased risk for sunburn.  Patient instructed to avoid sunlight, if possible.  When exposed to sunlight, patients should wear protective clothing, sunglasses, and sunscreen.  The patient was instructed to call the office immediately if the following severe adverse effects occur:  hearing changes, easy bruising/bleeding, severe headache, or vision changes.  The patient verbalized understanding of the proper use and possible adverse effects of doxycycline.  All of the patient's questions and concerns were addressed. Azithromycin Counseling:  I discussed with the patient the risks of azithromycin including but not limited to GI upset, allergic reaction, drug rash, diarrhea, and yeast infections. Topical Clindamycin Counseling: Patient counseled that this medication may cause skin irritation or allergic reactions.  In the event of skin irritation, the patient was advised to reduce the amount of the drug applied or use it less frequently.   The patient verbalized understanding of the proper use and possible adverse effects of clindamycin.  All of the patient's questions and concerns were addressed. Detail Level: Zone Topical Retinoid counseling:  Patient advised to apply a pea-sized amount only at bedtime and wait 30 minutes after washing their face before applying.  If too drying, patient may add a non-comedogenic moisturizer. The patient verbalized understanding of the proper use and possible adverse effects of retinoids.  All of the patient's questions and concerns were addressed. Isotretinoin Pregnancy And Lactation Text: This medication is Pregnancy Category X and is considered extremely dangerous during pregnancy. It is unknown if it is excreted in breast milk.

## 2022-08-10 NOTE — OP NOTE
Ureteroscopy, bladder stone removal, retrograde ureteropyelogram    Dorita Conte  8/10/2022    Pre-op Diagnosis:   Left ureteral stone [N20.1]  Left lower quadrant abdominal pain [R10.32]  Bilateral flank pain [R10.9]    Post-op Diagnosis:     Post-Op Diagnosis Codes:     * Left ureteral stone [N20.1]     * Left lower quadrant abdominal pain [R10.32]     * Bilateral flank pain [R10.9]    Procedure/CPT® Codes:    47-year-old white male who started with a large 1.2 cm left renal pelvic stone in March had a good lithotripsy but did not keep his follow-up presented to the emergency room with a 7-day history of severe left-sided pain was found to have a 1 cm distal stone.  He now presents for ureteroscopy.  Following an informed consent brought the procedure suite.  Prepped and draped in a sterile fashion.  Used the 4.5 Lao ureteroscope showing a full bladder and a large stone lying in the posterior aspect which was easily moved removed and sent for pathologic confirmation the ureter was obviously irritated but I was able to pass the scope up to the level the renal pelvis was opacified good free flow by bolus contraction with no complication encountered.  The bladder was drained he tolerated it well he is now stone free we will see him back in 3 weeks to discuss metabolic parameters  Procedure(s):  URETEROSCOPY, RETROGRADE PYELOGRAM and remove stone    Surgeon(s):  Eric Garcia MD    Anesthesia: see anesthesia record    Staff:   Circulator: Ke Sue RN  Scrub Person: Iveth Mora LPN  Other: Leyda Green    Estimated Blood Loss: none  Urine Voided: * No values recorded between 8/10/2022  8:20 AM and 8/10/2022  8:52 AM *    Specimens:                ID Type Source Tests Collected by Time   1 : Left ureteral stone Calculus Ureter, Left STONE ANALYSIS Eric Garcia MD 8/10/2022 0834         Drains: None    Findings: Large stone lying in the base of the bladder which was removed  with stone basket extraction    Blood: N/A    Complications: None    Grafts and Implants: None    Eric Garcia MD     Date: 8/10/2022  Time: 09:13 EDT

## 2022-08-10 NOTE — ANESTHESIA PREPROCEDURE EVALUATION
Anesthesia Evaluation     no history of anesthetic complications:  NPO Solid Status: > 8 hours  NPO Liquid Status: > 8 hours           Airway   Mallampati: II  TM distance: >3 FB  Neck ROM: full  No difficulty expected  Dental    (+) poor dentition    Pulmonary - normal exam   (+) sleep apnea,   Cardiovascular - normal exam        Neuro/Psych  GI/Hepatic/Renal/Endo    (+)  GERD,  renal disease stones,     Musculoskeletal     Abdominal  - normal exam    Bowel sounds: normal.   Substance History      OB/GYN          Other                          Anesthesia Plan    ASA 2     general     intravenous induction     Anesthetic plan, risks, benefits, and alternatives have been provided, discussed and informed consent has been obtained with: patient.        CODE STATUS:

## 2022-08-15 LAB
CA H2 PHOS DIHYD MFR STONE: 80 %
CALCIUM OXALATE DIHYDRATE MFR STONE IR: 20 %
COLOR STONE: NORMAL
COMPN STONE: NORMAL
LABORATORY COMMENT REPORT: NORMAL
LABORATORY COMMENT REPORT: NORMAL
Lab: NORMAL
Lab: NORMAL
PHOTO: NORMAL
SIZE STONE: NORMAL MM
SPEC SOURCE SUBJ: NORMAL
WT STONE: 326 MG

## 2022-08-23 ENCOUNTER — OFFICE VISIT (OUTPATIENT)
Dept: UROLOGY | Facility: CLINIC | Age: 47
End: 2022-08-23

## 2022-08-23 VITALS — HEIGHT: 69 IN | WEIGHT: 208 LBS | BODY MASS INDEX: 30.81 KG/M2

## 2022-08-23 DIAGNOSIS — N20.0 RENAL CALCULUS, LEFT: ICD-10-CM

## 2022-08-23 DIAGNOSIS — N20.1 LEFT URETERAL STONE: Primary | ICD-10-CM

## 2022-08-23 DIAGNOSIS — N20.0 KIDNEY STONE: ICD-10-CM

## 2022-08-23 LAB
BILIRUB BLD-MCNC: NEGATIVE MG/DL
CLARITY, POC: CLEAR
COLOR UR: YELLOW
EXPIRATION DATE: NORMAL
GLUCOSE UR STRIP-MCNC: NEGATIVE MG/DL
KETONES UR QL: NEGATIVE
LEUKOCYTE EST, POC: NEGATIVE
Lab: NORMAL
NITRITE UR-MCNC: NEGATIVE MG/ML
PH UR: 5.5 [PH] (ref 5–8)
PROT UR STRIP-MCNC: NEGATIVE MG/DL
RBC # UR STRIP: NEGATIVE /UL
SP GR UR: 1.02 (ref 1–1.03)
UROBILINOGEN UR QL: NORMAL

## 2022-08-23 PROCEDURE — 83970 ASSAY OF PARATHORMONE: CPT | Performed by: UROLOGY

## 2022-08-23 PROCEDURE — 99213 OFFICE O/P EST LOW 20 MIN: CPT | Performed by: UROLOGY

## 2022-08-23 PROCEDURE — 81003 URINALYSIS AUTO W/O SCOPE: CPT | Performed by: UROLOGY

## 2022-08-23 PROCEDURE — 82310 ASSAY OF CALCIUM: CPT | Performed by: UROLOGY

## 2022-08-23 PROCEDURE — 84100 ASSAY OF PHOSPHORUS: CPT | Performed by: UROLOGY

## 2022-08-23 PROCEDURE — 82565 ASSAY OF CREATININE: CPT | Performed by: UROLOGY

## 2022-08-23 RX ORDER — TAMSULOSIN HYDROCHLORIDE 0.4 MG/1
1 CAPSULE ORAL DAILY
Qty: 30 CAPSULE | Refills: 3 | Status: SHIPPED | OUTPATIENT
Start: 2022-08-23

## 2022-08-25 LAB
CALCIUM SERPL-MCNC: 9.7 MG/DL (ref 8.7–10.2)
CREAT SERPL-MCNC: 0.97 MG/DL (ref 0.76–1.27)
EGFRCR-CYS SERPLBLD CKD-EPI 2021: 96 ML/MIN/1.73
INTACT PTH: NORMAL
PHOSPHATE SERPL-MCNC: 3.5 MG/DL (ref 2.8–4.1)
PTH-INTACT SERPL-MCNC: 15 PG/ML (ref 15–65)

## 2022-09-12 ENCOUNTER — HOSPITAL ENCOUNTER (OUTPATIENT)
Dept: GENERAL RADIOLOGY | Facility: HOSPITAL | Age: 47
Discharge: HOME | End: 2022-09-12

## 2022-09-12 ENCOUNTER — OFFICE VISIT (OUTPATIENT)
Dept: UROLOGY | Facility: CLINIC | Age: 47
End: 2022-09-12

## 2022-09-12 VITALS — WEIGHT: 208 LBS | HEIGHT: 69 IN | BODY MASS INDEX: 30.81 KG/M2

## 2022-09-12 DIAGNOSIS — N20.0 RENAL CALCULUS, LEFT: ICD-10-CM

## 2022-09-12 DIAGNOSIS — N20.0 RIGHT RENAL STONE: Primary | ICD-10-CM

## 2022-09-12 PROCEDURE — 74018 RADEX ABDOMEN 1 VIEW: CPT

## 2022-09-12 PROCEDURE — 74018 RADEX ABDOMEN 1 VIEW: CPT | Performed by: RADIOLOGY

## 2022-09-12 PROCEDURE — 99214 OFFICE O/P EST MOD 30 MIN: CPT

## 2022-09-12 RX ORDER — OXYCODONE AND ACETAMINOPHEN 10; 325 MG/1; MG/1
1 TABLET ORAL EVERY 4 HOURS PRN
Qty: 12 TABLET | Refills: 0 | Status: SHIPPED | OUTPATIENT
Start: 2022-09-12

## 2022-09-12 RX ORDER — IBUPROFEN 800 MG/1
800 TABLET ORAL EVERY 8 HOURS PRN
Qty: 60 TABLET | Refills: 2 | Status: SHIPPED | OUTPATIENT
Start: 2022-09-12

## 2022-09-12 NOTE — PROGRESS NOTES
"Chief Complaint:    Chief Complaint   Patient presents with   • Nephrolithiasis     Follow up        Vital Signs:   Ht 175.3 cm (69.02\")   Wt 94.3 kg (208 lb)   BMI 30.70 kg/m²   Body mass index is 30.7 kg/m².      HPI:  Dorita Conte is a 47 y.o. male who presents today for follow up    History of Present Illness  Mr. Conte presents to the clinic for a follow up for nephrolithiasis. Patient under urterescopy with laser lithotripsy by Dr. Garcia on 08/08/22. He has missed two follow up appointments since. He reports he is still having right flank/back pain with some testicular pain and is concerned that he has another stone. He was seen by his PCP this past Friday and UA revealed microscopic hematuria with no other abnormalities. KUB completed today shows a distal right ureteral stone near the UVJ.  Mr. Conte presents to the clinic for a follow up for nephrolithiasis. Patient under urterescopy with laser lithotripsy by Dr. Garcia on 08/08/22. He has missed two follow up appointments since. He reports he is still having right flank/back pain with some testicular pain and is concerned that he has another stone. He was seen by his PCP this past Friday and UA revealed microscopic hematuria with no other abnormalities. KUB completed today shows a distal right ureteral stone near the UVJ.      Past Medical History:  Past Medical History:   Diagnosis Date   • GERD (gastroesophageal reflux disease)    • History of transfusion    • Sleep apnea     no c-pap       Current Meds:  Current Outpatient Medications   Medication Sig Dispense Refill   • oxyCODONE-acetaminophen (Percocet)  MG per tablet Take 1 tablet by mouth Every 4 (Four) Hours As Needed for Moderate Pain. 12 tablet 0   • ciprofloxacin (CIPRO) 500 MG tablet      • HYDROcodone-acetaminophen (Norco)  MG per tablet Take 1 tablet by mouth Every 6 (Six) Hours As Needed for Moderate Pain . 10 tablet 0   • ibuprofen (ADVIL,MOTRIN) 800 MG tablet Take 1 tablet by " mouth Every 8 (Eight) Hours As Needed for Moderate Pain. 60 tablet 2   • ketorolac (TORADOL) 10 MG tablet      • ondansetron (ZOFRAN) 4 MG tablet Take 1 tablet by mouth Every 6 (Six) Hours As Needed for Nausea or Vomiting. 15 tablet 0   • tamsulosin (FLOMAX) 0.4 MG capsule 24 hr capsule Take 1 capsule by mouth Daily. 30 capsule 3   • Testosterone Cypionate (DEPOTESTOTERONE CYPIONATE) 200 MG/ML injection        No current facility-administered medications for this visit.        Allergies:   Allergies   Allergen Reactions   • Hydrocodone-Acetaminophen Anxiety        Past Surgical History:  Past Surgical History:   Procedure Laterality Date   • BACK SURGERY      cervical instrumentation   • EXTRACORPOREAL SHOCK WAVE LITHOTRIPSY (ESWL) Left 03/25/2022    Procedure: EXTRACORPOREAL SHOCKWAVE LITHOTRIPSY;  Surgeon: Eric Garcia MD;  Location: [facility];  Service: Urology;  Laterality: Left;   • EYE SURGERY     • FRACTURE SURGERY      nasal   • HERNIA REPAIR Left     inguinal   • URETEROSCOPY LASER LITHOTRIPSY WITH STENT INSERTION Left 8/10/2022    Procedure: URETEROSCOPY, RETROGRADE PYELOGRAM;  Surgeon: Eric Garcia MD;  Location: [facility];  Service: Urology;  Laterality: Left;       Social History:  Social History     Socioeconomic History   • Marital status: Single   Tobacco Use   • Smoking status: Never Smoker   • Smokeless tobacco: Never Used   Vaping Use   • Vaping Use: Never used   Substance and Sexual Activity   • Alcohol use: Not Currently     Comment: occasional   • Drug use: Never   • Sexual activity: Defer       Family History:  History reviewed. No pertinent family history.    Review of Systems:  Review of Systems   Constitutional: Positive for fatigue. Negative for chills and fever.   HENT: Negative for congestion and sinus pressure.    Respiratory: Negative for chest tightness and shortness of breath.    Cardiovascular: Negative for chest pain.   Gastrointestinal: Negative for abdominal  pain, constipation, diarrhea, nausea and vomiting.   Genitourinary: Positive for flank pain and frequency. Negative for hematuria and urgency.   Musculoskeletal: Positive for back pain. Negative for neck pain.   Skin: Negative for rash.   Neurological: Negative for dizziness and headaches.   Hematological: Does not bruise/bleed easily.   Psychiatric/Behavioral: The patient is not nervous/anxious.        Physical Exam:  Physical Exam  Constitutional:       General: He is not in acute distress.     Appearance: Normal appearance.   HENT:      Head: Normocephalic and atraumatic.      Nose: Nose normal.      Mouth/Throat:      Mouth: Mucous membranes are moist.   Eyes:      Conjunctiva/sclera: Conjunctivae normal.   Cardiovascular:      Rate and Rhythm: Normal rate and regular rhythm.      Pulses: Normal pulses.      Heart sounds: Normal heart sounds.   Pulmonary:      Effort: Pulmonary effort is normal.      Breath sounds: Normal breath sounds.   Abdominal:      General: Bowel sounds are normal.      Palpations: Abdomen is soft.      Tenderness: There is no right CVA tenderness or left CVA tenderness.   Musculoskeletal:         General: Normal range of motion.      Cervical back: Normal range of motion.   Skin:     General: Skin is warm.   Neurological:      General: No focal deficit present.      Mental Status: He is alert and oriented to person, place, and time.   Psychiatric:         Mood and Affect: Mood normal.         Behavior: Behavior normal.         Thought Content: Thought content normal.         Judgment: Judgment normal.         Recent Image (CT and/or KUB):   CT Abdomen and Pelvis: No results found for this or any previous visit.     CT Stone Protocol: No results found for this or any previous visit.     KUB: Results for orders placed during the hospital encounter of 03/21/22    XR Abdomen KUB    Narrative  EXAM:  XR Abdomen, 1 View    EXAM DATE:  3/21/2022 9:38 AM    CLINICAL HISTORY:  Left-sided flank  pain with kidney stone    TECHNIQUE:  Frontal supine view of the abdomen/pelvis.    COMPARISON:  03/19/2022    FINDINGS:  Gastrointestinal tract:  Mild constipation.  No dilation.  Organs:  12.4 mm stone in the region of the proximal left ureter is  stable.  Nonobstructing left kidney stones again noted.  Bones/joints:  Unremarkable.    Impression  1.  No change in location of a 12 mm left ureteral stone.  2.  Other nonobstructing left kidney stones again noted.    This report was finalized on 3/21/2022 10:04 AM by Dr. Yazan Kat MD.       Labs:  Brief Urine Lab Results  (Last result in the past 365 days)      Color   Clarity   Blood   Leuk Est   Nitrite   Protein   CREAT   Urine HCG        08/23/22 1448 Yellow   Clear   Negative   Negative   Negative   Negative               Office Visit on 08/23/2022   Component Date Value Ref Range Status   • Color 08/23/2022 Yellow  Yellow, Straw, Dark Yellow, Brenda Final   • Clarity, UA 08/23/2022 Clear  Clear Final   • Specific Gravity  08/23/2022 1.025  1.005 - 1.030 Final   • pH, Urine 08/23/2022 5.5  5.0 - 8.0 Final   • Leukocytes 08/23/2022 Negative  Negative Final   • Nitrite, UA 08/23/2022 Negative  Negative Final   • Protein, POC 08/23/2022 Negative  Negative mg/dL Final   • Glucose, UA 08/23/2022 Negative  Negative mg/dL Final   • Ketones, UA 08/23/2022 Negative  Negative Final   • Urobilinogen, UA 08/23/2022 Normal  Normal, 0.2 E.U./dL Final   • Bilirubin 08/23/2022 Negative  Negative Final   • Blood, UA 08/23/2022 Negative  Negative Final   • Lot Number 08/23/2022 [id number]   Final   • Expiration Date 08/23/2022 122,123   Final   • Calcium 08/23/2022 9.7  8.7 - 10.2 mg/dL Final   • Phosphorus 08/23/2022 3.5  2.8 - 4.1 mg/dL Final   • Creatinine 08/23/2022 0.97  0.76 - 1.27 mg/dL Final   • EGFR Result 08/23/2022 96  >59 mL/min/1.73 Final   • PTH, Intact 08/23/2022 15  15 - 65 pg/mL Final   • PTH, Intact 08/23/2022 Comment   Final    Interpretation                  Intact PTH    Calcium                                  (pg/mL)      (mg/dL)  Normal                          15 - 65     8.6 - 10.2  Primary Hyperparathyroidism         >65          >10.2  Secondary Hyperparathyroidism       >65          <10.2  Non-Parathyroid Hypercalcemia       <65          >10.2  Hypoparathyroidism                  <15          < 8.6  Non-Parathyroid Hypocalcemia    15 - 65          < 8.6   Admission on 08/10/2022, Discharged on 08/10/2022   Component Date Value Ref Range Status   • Stone Source 08/10/2022 Comment   Final    Left Ureter   • Color 08/10/2022 Tan   Final   • Size 08/10/2022 13x7  mm Final    Single piece received.   • Stone Weight 08/10/2022 326  mg Final   • Composition 08/10/2022 Comment   Final    Percentage (Represents the % composition)   • Ca Oxalate-Dihydrate, Stone 08/10/2022 20  % Final   • Ca Hydrogen Phos. 08/10/2022 80  % Final   • Comment 08/10/2022 Comment   Final    Calculus received in liquid. Wet calculi must be dried  before analysis, which delays reporting of results. Leaving  calculi in liquid (such as water, saline, blood, urine) may  lead to changes in composition.   • Photo 08/10/2022 Comment   Final    Photograph will follow under a separate cover   • Comments: 08/10/2022 Comment   Final    Physician questions regarding Calculi Analysis contact  [employer] [facility] ([facility] at: [phone].   • Please note 08/10/2022 Comment   Final    Calculi report will follow via computer, mail or   delivery.   • Disclaimer: 08/10/2022 Comment   Final    This test was developed and its performance characteristics  determined by [employer].  It has not been cleared or approved  by the Food and Drug Administration.   Pre-Admission Testing on 08/09/2022   Component Date Value Ref Range Status   • WBC 08/09/2022 4.58  3.40 - 10.80 10*3/mm3 Final   • RBC 08/09/2022 4.41  4.14 - 5.80 10*6/mm3 Final   • Hemoglobin 08/09/2022 14.5  13.0 - 17.7 g/dL Final   • Hematocrit 08/09/2022 42.6  37.5  - 51.0 % Final   • MCV 08/09/2022 96.6  79.0 - 97.0 fL Final   • MCH 08/09/2022 32.9  26.6 - 33.0 pg Final   • MCHC 08/09/2022 34.0  31.5 - 35.7 g/dL Final   • RDW 08/09/2022 12.9  12.3 - 15.4 % Final   • RDW-SD 08/09/2022 45.8  37.0 - 54.0 fl Final   • MPV 08/09/2022 9.6  6.0 - 12.0 fL Final   • Platelets 08/09/2022 167  140 - 450 10*3/mm3 Final   • Glucose 08/09/2022 104 (A) 65 - 99 mg/dL Final   • BUN 08/09/2022 10  6 - 20 mg/dL Final   • Creatinine 08/09/2022 0.93  0.76 - 1.27 mg/dL Final   • Sodium 08/09/2022 139  136 - 145 mmol/L Final   • Potassium 08/09/2022 3.9  3.5 - 5.2 mmol/L Final   • Chloride 08/09/2022 102  98 - 107 mmol/L Final   • CO2 08/09/2022 28.1  22.0 - 29.0 mmol/L Final   • Calcium 08/09/2022 9.2  8.6 - 10.5 mg/dL Final   • Total Protein 08/09/2022 6.6  6.0 - 8.5 g/dL Final   • Albumin 08/09/2022 4.19  3.50 - 5.20 g/dL Final   • ALT (SGPT) 08/09/2022 19  1 - 41 U/L Final   • AST (SGOT) 08/09/2022 17  1 - 40 U/L Final   • Alkaline Phosphatase 08/09/2022 70  39 - 117 U/L Final   • Total Bilirubin 08/09/2022 0.3  0.0 - 1.2 mg/dL Final   • Globulin 08/09/2022 2.4  gm/dL Final   • A/G Ratio 08/09/2022 1.7  g/dL Final   • BUN/Creatinine Ratio 08/09/2022 10.8  7.0 - 25.0 Final   • Anion Gap 08/09/2022 8.9  5.0 - 15.0 mmol/L Final   • eGFR 08/09/2022 100.7  >60.0 mL/min/1.73 Final    National Kidney Foundation and American Society of Nephrology (ASN) Task Force recommended calculation based on the Chronic Kidney Disease Epidemiology Collaboration (CKD-EPI) equation refit without adjustment for race.        Procedure: None  Procedures     I have reviewed and agree with the above PMH, PSH, FMH, social history, medications, allergies, and labs.     Assessment/Plan:   Problem List Items Addressed This Visit        Genitourinary and Reproductive     Renal calculus, left    Relevant Medications    oxyCODONE-acetaminophen (Percocet)  MG per tablet    ibuprofen (ADVIL,MOTRIN) 800 MG tablet      Other  Visit Diagnoses     Kidney stone    -  Primary    Relevant Orders    XR abdomen kub (Completed)          Health Maintenance:   Health Maintenance Due   Topic Date Due   • COLORECTAL CANCER SCREENING  Never done   • ANNUAL PHYSICAL  Never done   • TDAP/TD VACCINES (1 - Tdap) Never done   • COVID-19 Vaccine (2 - Pfizer series) 11/02/2021   • HEPATITIS C SCREENING  Never done        Smoking Counseling: Patient has never smoked    Urine Incontinence: Patient reports that he is not currently experiencing any symptoms of urinary incontinence.    Patient was given instructions and counseling regarding his condition or for health maintenance advice. Please see specific information pulled into the AVS if appropriate.    Patient Education:   Right renal calculus -I discussed with the patient the presence of a 4 mm right distal calculi near the UVJ.  Case was discussed with Dr. Garcia.  We will have the patient perform a 24-hour urine for analysis of possible metabolic abnormality that could be leading to nephrolithiasis.  Patient has previous testing for parathyroid tumor that came back negative.  We discussed with the patient's the need to increase fluid intake to 2 to 3 L of water per day.  We discussed with the patient the use of Flomax daily. We discussed the various therapeutic options available including percutaneous nephrostolithotomy, ureteroscopy and extracorporeal shockwave  lithotripsy.  We discussed the risks of lithotripsy including the passage of stones leading to a 3% chance of Steinstrasse or a large string of stones in the distal ureter. In this incidence the patient was informed that a ureteroscopy is indicated for obstructing fragments.  Patient was informed of an extremely rare incidence of renal hematoma and the significance of this.  Patient was educated on percutaneous nephrostolithotomy and its use as well as the risks and benefits such as the need for postoperative hospitalization, and the risk of  damage to the kidney and the remote risk of a nephrectomy.  We also discussed the use of ureteroscopy in the upper tracts and its decreased success rate to completely remove the stones likely causing stent placement leading to an additional procedure for removal.  We discussed the absolute relative indicators for intervention including the presence of sepsis and uncontrollable pain leading to need for urgent intervention.  We discussed placement of a stent if indicated and the management of the stent as well.  I will follow-up with the patient in 1 month for reevaluation of symptoms and to discuss test results.      Visit Diagnoses:    ICD-10-CM ICD-9-CM   1. Kidney stone  N20.0 592.0   2. Renal calculus, left  N20.0 592.0       Meds Ordered During Visit:  New Medications Ordered This Visit   Medications   • oxyCODONE-acetaminophen (Percocet)  MG per tablet     Sig: Take 1 tablet by mouth Every 4 (Four) Hours As Needed for Moderate Pain.     Dispense:  12 tablet     Refill:  0   • ibuprofen (ADVIL,MOTRIN) 800 MG tablet     Sig: Take 1 tablet by mouth Every 8 (Eight) Hours As Needed for Moderate Pain.     Dispense:  60 tablet     Refill:  2       Follow Up Appointment: 1 month  No follow-ups on file.      This document has been electronically signed by Carlos Sheriff PA-C   September 12, 2022 13:04 EDT    Part of this note may be an electronic transcription/translation of spoken language to printed text using the Dragon Dictation System.

## 2022-09-12 NOTE — H&P (VIEW-ONLY)
"Chief Complaint:    Chief Complaint   Patient presents with   • Nephrolithiasis     Follow up        Vital Signs:   Ht 175.3 cm (69.02\")   Wt 94.3 kg (208 lb)   BMI 30.70 kg/m²   Body mass index is 30.7 kg/m².      HPI:  Dorita Conte is a 47 y.o. male who presents today for follow up    History of Present Illness  Mr. Conte presents to the clinic for a follow up for nephrolithiasis. Patient under urterescopy with laser lithotripsy by Dr. Garcia on 08/08/22. He has missed two follow up appointments since. He reports he is still having right flank/back pain with some testicular pain and is concerned that he has another stone. He was seen by his PCP this past Friday and UA revealed microscopic hematuria with no other abnormalities. KUB completed today shows a distal right ureteral stone near the UVJ.  Mr. Conte presents to the clinic for a follow up for nephrolithiasis. Patient under urterescopy with laser lithotripsy by Dr. Garcai on 08/08/22. He has missed two follow up appointments since. He reports he is still having right flank/back pain with some testicular pain and is concerned that he has another stone. He was seen by his PCP this past Friday and UA revealed microscopic hematuria with no other abnormalities. KUB completed today shows a distal right ureteral stone near the UVJ.      Past Medical History:  Past Medical History:   Diagnosis Date   • GERD (gastroesophageal reflux disease)    • History of transfusion    • Sleep apnea     no c-pap       Current Meds:  Current Outpatient Medications   Medication Sig Dispense Refill   • oxyCODONE-acetaminophen (Percocet)  MG per tablet Take 1 tablet by mouth Every 4 (Four) Hours As Needed for Moderate Pain. 12 tablet 0   • ciprofloxacin (CIPRO) 500 MG tablet      • HYDROcodone-acetaminophen (Norco)  MG per tablet Take 1 tablet by mouth Every 6 (Six) Hours As Needed for Moderate Pain . 10 tablet 0   • ibuprofen (ADVIL,MOTRIN) 800 MG tablet Take 1 tablet by " mouth Every 8 (Eight) Hours As Needed for Moderate Pain. 60 tablet 2   • ketorolac (TORADOL) 10 MG tablet      • ondansetron (ZOFRAN) 4 MG tablet Take 1 tablet by mouth Every 6 (Six) Hours As Needed for Nausea or Vomiting. 15 tablet 0   • tamsulosin (FLOMAX) 0.4 MG capsule 24 hr capsule Take 1 capsule by mouth Daily. 30 capsule 3   • Testosterone Cypionate (DEPOTESTOTERONE CYPIONATE) 200 MG/ML injection        No current facility-administered medications for this visit.        Allergies:   Allergies   Allergen Reactions   • Hydrocodone-Acetaminophen Anxiety        Past Surgical History:  Past Surgical History:   Procedure Laterality Date   • BACK SURGERY      cervical instrumentation   • EXTRACORPOREAL SHOCK WAVE LITHOTRIPSY (ESWL) Left 03/25/2022    Procedure: EXTRACORPOREAL SHOCKWAVE LITHOTRIPSY;  Surgeon: Eric Garcia MD;  Location: [facility];  Service: Urology;  Laterality: Left;   • EYE SURGERY     • FRACTURE SURGERY      nasal   • HERNIA REPAIR Left     inguinal   • URETEROSCOPY LASER LITHOTRIPSY WITH STENT INSERTION Left 8/10/2022    Procedure: URETEROSCOPY, RETROGRADE PYELOGRAM;  Surgeon: Eric Garcia MD;  Location: [facility];  Service: Urology;  Laterality: Left;       Social History:  Social History     Socioeconomic History   • Marital status: Single   Tobacco Use   • Smoking status: Never Smoker   • Smokeless tobacco: Never Used   Vaping Use   • Vaping Use: Never used   Substance and Sexual Activity   • Alcohol use: Not Currently     Comment: occasional   • Drug use: Never   • Sexual activity: Defer       Family History:  History reviewed. No pertinent family history.    Review of Systems:  Review of Systems   Constitutional: Positive for fatigue. Negative for chills and fever.   HENT: Negative for congestion and sinus pressure.    Respiratory: Negative for chest tightness and shortness of breath.    Cardiovascular: Negative for chest pain.   Gastrointestinal: Negative for abdominal  pain, constipation, diarrhea, nausea and vomiting.   Genitourinary: Positive for flank pain and frequency. Negative for hematuria and urgency.   Musculoskeletal: Positive for back pain. Negative for neck pain.   Skin: Negative for rash.   Neurological: Negative for dizziness and headaches.   Hematological: Does not bruise/bleed easily.   Psychiatric/Behavioral: The patient is not nervous/anxious.        Physical Exam:  Physical Exam  Constitutional:       General: He is not in acute distress.     Appearance: Normal appearance.   HENT:      Head: Normocephalic and atraumatic.      Nose: Nose normal.      Mouth/Throat:      Mouth: Mucous membranes are moist.   Eyes:      Conjunctiva/sclera: Conjunctivae normal.   Cardiovascular:      Rate and Rhythm: Normal rate and regular rhythm.      Pulses: Normal pulses.      Heart sounds: Normal heart sounds.   Pulmonary:      Effort: Pulmonary effort is normal.      Breath sounds: Normal breath sounds.   Abdominal:      General: Bowel sounds are normal.      Palpations: Abdomen is soft.      Tenderness: There is no right CVA tenderness or left CVA tenderness.   Musculoskeletal:         General: Normal range of motion.      Cervical back: Normal range of motion.   Skin:     General: Skin is warm.   Neurological:      General: No focal deficit present.      Mental Status: He is alert and oriented to person, place, and time.   Psychiatric:         Mood and Affect: Mood normal.         Behavior: Behavior normal.         Thought Content: Thought content normal.         Judgment: Judgment normal.         Recent Image (CT and/or KUB):   CT Abdomen and Pelvis: No results found for this or any previous visit.     CT Stone Protocol: No results found for this or any previous visit.     KUB: Results for orders placed during the hospital encounter of 03/21/22    XR Abdomen KUB    Narrative  EXAM:  XR Abdomen, 1 View    EXAM DATE:  3/21/2022 9:38 AM    CLINICAL HISTORY:  Left-sided flank  pain with kidney stone    TECHNIQUE:  Frontal supine view of the abdomen/pelvis.    COMPARISON:  03/19/2022    FINDINGS:  Gastrointestinal tract:  Mild constipation.  No dilation.  Organs:  12.4 mm stone in the region of the proximal left ureter is  stable.  Nonobstructing left kidney stones again noted.  Bones/joints:  Unremarkable.    Impression  1.  No change in location of a 12 mm left ureteral stone.  2.  Other nonobstructing left kidney stones again noted.    This report was finalized on 3/21/2022 10:04 AM by Dr. Yazan Kat MD.       Labs:  Brief Urine Lab Results  (Last result in the past 365 days)      Color   Clarity   Blood   Leuk Est   Nitrite   Protein   CREAT   Urine HCG        08/23/22 1448 Yellow   Clear   Negative   Negative   Negative   Negative               Office Visit on 08/23/2022   Component Date Value Ref Range Status   • Color 08/23/2022 Yellow  Yellow, Straw, Dark Yellow, Brenda Final   • Clarity, UA 08/23/2022 Clear  Clear Final   • Specific Gravity  08/23/2022 1.025  1.005 - 1.030 Final   • pH, Urine 08/23/2022 5.5  5.0 - 8.0 Final   • Leukocytes 08/23/2022 Negative  Negative Final   • Nitrite, UA 08/23/2022 Negative  Negative Final   • Protein, POC 08/23/2022 Negative  Negative mg/dL Final   • Glucose, UA 08/23/2022 Negative  Negative mg/dL Final   • Ketones, UA 08/23/2022 Negative  Negative Final   • Urobilinogen, UA 08/23/2022 Normal  Normal, 0.2 E.U./dL Final   • Bilirubin 08/23/2022 Negative  Negative Final   • Blood, UA 08/23/2022 Negative  Negative Final   • Lot Number 08/23/2022 [id number]   Final   • Expiration Date 08/23/2022 122,123   Final   • Calcium 08/23/2022 9.7  8.7 - 10.2 mg/dL Final   • Phosphorus 08/23/2022 3.5  2.8 - 4.1 mg/dL Final   • Creatinine 08/23/2022 0.97  0.76 - 1.27 mg/dL Final   • EGFR Result 08/23/2022 96  >59 mL/min/1.73 Final   • PTH, Intact 08/23/2022 15  15 - 65 pg/mL Final   • PTH, Intact 08/23/2022 Comment   Final    Interpretation                  Intact PTH    Calcium                                  (pg/mL)      (mg/dL)  Normal                          15 - 65     8.6 - 10.2  Primary Hyperparathyroidism         >65          >10.2  Secondary Hyperparathyroidism       >65          <10.2  Non-Parathyroid Hypercalcemia       <65          >10.2  Hypoparathyroidism                  <15          < 8.6  Non-Parathyroid Hypocalcemia    15 - 65          < 8.6   Admission on 08/10/2022, Discharged on 08/10/2022   Component Date Value Ref Range Status   • Stone Source 08/10/2022 Comment   Final    Left Ureter   • Color 08/10/2022 Tan   Final   • Size 08/10/2022 13x7  mm Final    Single piece received.   • Stone Weight 08/10/2022 326  mg Final   • Composition 08/10/2022 Comment   Final    Percentage (Represents the % composition)   • Ca Oxalate-Dihydrate, Stone 08/10/2022 20  % Final   • Ca Hydrogen Phos. 08/10/2022 80  % Final   • Comment 08/10/2022 Comment   Final    Calculus received in liquid. Wet calculi must be dried  before analysis, which delays reporting of results. Leaving  calculi in liquid (such as water, saline, blood, urine) may  lead to changes in composition.   • Photo 08/10/2022 Comment   Final    Photograph will follow under a separate cover   • Comments: 08/10/2022 Comment   Final    Physician questions regarding Calculi Analysis contact  [facility] at: [phone].   • Please note 08/10/2022 Comment   Final    Calculi report will follow via computer, mail or   delivery.   • Disclaimer: 08/10/2022 Comment   Final    This test was developed and its performance characteristics  determined by [facility].  It has not been cleared or approved  by the Food and Drug Administration.   Pre-Admission Testing on 08/09/2022   Component Date Value Ref Range Status   • WBC 08/09/2022 4.58  3.40 - 10.80 10*3/mm3 Final   • RBC 08/09/2022 4.41  4.14 - 5.80 10*6/mm3 Final   • Hemoglobin 08/09/2022 14.5  13.0 - 17.7 g/dL Final   • Hematocrit 08/09/2022 42.6  37.5  - 51.0 % Final   • MCV 08/09/2022 96.6  79.0 - 97.0 fL Final   • MCH 08/09/2022 32.9  26.6 - 33.0 pg Final   • MCHC 08/09/2022 34.0  31.5 - 35.7 g/dL Final   • RDW 08/09/2022 12.9  12.3 - 15.4 % Final   • RDW-SD 08/09/2022 45.8  37.0 - 54.0 fl Final   • MPV 08/09/2022 9.6  6.0 - 12.0 fL Final   • Platelets 08/09/2022 167  140 - 450 10*3/mm3 Final   • Glucose 08/09/2022 104 (A) 65 - 99 mg/dL Final   • BUN 08/09/2022 10  6 - 20 mg/dL Final   • Creatinine 08/09/2022 0.93  0.76 - 1.27 mg/dL Final   • Sodium 08/09/2022 139  136 - 145 mmol/L Final   • Potassium 08/09/2022 3.9  3.5 - 5.2 mmol/L Final   • Chloride 08/09/2022 102  98 - 107 mmol/L Final   • CO2 08/09/2022 28.1  22.0 - 29.0 mmol/L Final   • Calcium 08/09/2022 9.2  8.6 - 10.5 mg/dL Final   • Total Protein 08/09/2022 6.6  6.0 - 8.5 g/dL Final   • Albumin 08/09/2022 4.19  3.50 - 5.20 g/dL Final   • ALT (SGPT) 08/09/2022 19  1 - 41 U/L Final   • AST (SGOT) 08/09/2022 17  1 - 40 U/L Final   • Alkaline Phosphatase 08/09/2022 70  39 - 117 U/L Final   • Total Bilirubin 08/09/2022 0.3  0.0 - 1.2 mg/dL Final   • Globulin 08/09/2022 2.4  gm/dL Final   • A/G Ratio 08/09/2022 1.7  g/dL Final   • BUN/Creatinine Ratio 08/09/2022 10.8  7.0 - 25.0 Final   • Anion Gap 08/09/2022 8.9  5.0 - 15.0 mmol/L Final   • eGFR 08/09/2022 100.7  >60.0 mL/min/1.73 Final    National Kidney Foundation and American Society of Nephrology (ASN) Task Force recommended calculation based on the Chronic Kidney Disease Epidemiology Collaboration (CKD-EPI) equation refit without adjustment for race.        Procedure: None  Procedures     I have reviewed and agree with the above PMH, PSH, FMH, social history, medications, allergies, and labs.     Assessment/Plan:   Problem List Items Addressed This Visit        Genitourinary and Reproductive     Renal calculus, left    Relevant Medications    oxyCODONE-acetaminophen (Percocet)  MG per tablet    ibuprofen (ADVIL,MOTRIN) 800 MG tablet      Other  Visit Diagnoses     Kidney stone    -  Primary    Relevant Orders    XR abdomen kub (Completed)          Health Maintenance:   Health Maintenance Due   Topic Date Due   • COLORECTAL CANCER SCREENING  Never done   • ANNUAL PHYSICAL  Never done   • TDAP/TD VACCINES (1 - Tdap) Never done   • COVID-19 Vaccine (2 - Pfizer series) 11/02/2021   • HEPATITIS C SCREENING  Never done        Smoking Counseling: Patient has never smoked    Urine Incontinence: Patient reports that he is not currently experiencing any symptoms of urinary incontinence.    Patient was given instructions and counseling regarding his condition or for health maintenance advice. Please see specific information pulled into the AVS if appropriate.    Patient Education:   Right renal calculus -I discussed with the patient the presence of a 4 mm right distal calculi near the UVJ.  Case was discussed with Dr. Garcia.  We will have the patient perform a 24-hour urine for analysis of possible metabolic abnormality that could be leading to nephrolithiasis.  Patient has previous testing for parathyroid tumor that came back negative.  We discussed with the patient's the need to increase fluid intake to 2 to 3 L of water per day.  We discussed with the patient the use of Flomax daily. We discussed the various therapeutic options available including percutaneous nephrostolithotomy, ureteroscopy and extracorporeal shockwave  lithotripsy.  We discussed the risks of lithotripsy including the passage of stones leading to a 3% chance of Steinstrasse or a large string of stones in the distal ureter. In this incidence the patient was informed that a ureteroscopy is indicated for obstructing fragments.  Patient was informed of an extremely rare incidence of renal hematoma and the significance of this.  Patient was educated on percutaneous nephrostolithotomy and its use as well as the risks and benefits such as the need for postoperative hospitalization, and the risk of  damage to the kidney and the remote risk of a nephrectomy.  We also discussed the use of ureteroscopy in the upper tracts and its decreased success rate to completely remove the stones likely causing stent placement leading to an additional procedure for removal.  We discussed the absolute relative indicators for intervention including the presence of sepsis and uncontrollable pain leading to need for urgent intervention.  We discussed placement of a stent if indicated and the management of the stent as well.  I will follow-up with the patient in 1 month for reevaluation of symptoms and to discuss test results.      Visit Diagnoses:    ICD-10-CM ICD-9-CM   1. Kidney stone  N20.0 592.0   2. Renal calculus, left  N20.0 592.0       Meds Ordered During Visit:  New Medications Ordered This Visit   Medications   • oxyCODONE-acetaminophen (Percocet)  MG per tablet     Sig: Take 1 tablet by mouth Every 4 (Four) Hours As Needed for Moderate Pain.     Dispense:  12 tablet     Refill:  0   • ibuprofen (ADVIL,MOTRIN) 800 MG tablet     Sig: Take 1 tablet by mouth Every 8 (Eight) Hours As Needed for Moderate Pain.     Dispense:  60 tablet     Refill:  2       Follow Up Appointment: 1 month  No follow-ups on file.      This document has been electronically signed by Carlos Sheriff PA-C   September 12, 2022 13:04 EDT    Part of this note may be an electronic transcription/translation of spoken language to printed text using the Dragon Dictation System.

## 2022-09-19 ENCOUNTER — OFFICE VISIT (OUTPATIENT)
Dept: UROLOGY | Facility: CLINIC | Age: 47
End: 2022-09-19

## 2022-09-19 VITALS — HEIGHT: 69 IN | WEIGHT: 208 LBS | BODY MASS INDEX: 30.81 KG/M2

## 2022-09-19 DIAGNOSIS — N20.0 RIGHT KIDNEY STONE: Primary | ICD-10-CM

## 2022-09-19 DIAGNOSIS — N20.0 RENAL STONE: ICD-10-CM

## 2022-09-19 DIAGNOSIS — N20.1 RIGHT URETERAL STONE: ICD-10-CM

## 2022-09-19 PROCEDURE — 87086 URINE CULTURE/COLONY COUNT: CPT

## 2022-09-19 PROCEDURE — 96372 THER/PROPH/DIAG INJ SC/IM: CPT

## 2022-09-19 PROCEDURE — 99213 OFFICE O/P EST LOW 20 MIN: CPT

## 2022-09-19 RX ORDER — SULFAMETHOXAZOLE AND TRIMETHOPRIM 800; 160 MG/1; MG/1
1 TABLET ORAL 2 TIMES DAILY
Qty: 20 TABLET | Refills: 0 | Status: SHIPPED | OUTPATIENT
Start: 2022-09-19 | End: 2022-09-19 | Stop reason: SDUPTHER

## 2022-09-19 RX ORDER — KETOROLAC TROMETHAMINE 30 MG/ML
60 INJECTION, SOLUTION INTRAMUSCULAR; INTRAVENOUS ONCE
Status: COMPLETED | OUTPATIENT
Start: 2022-09-19 | End: 2022-09-19

## 2022-09-19 RX ORDER — SULFAMETHOXAZOLE AND TRIMETHOPRIM 800; 160 MG/1; MG/1
1 TABLET ORAL 2 TIMES DAILY
Qty: 20 TABLET | Refills: 0 | Status: SHIPPED | OUTPATIENT
Start: 2022-09-19

## 2022-09-19 RX ORDER — PHENAZOPYRIDINE HYDROCHLORIDE 200 MG/1
200 TABLET, FILM COATED ORAL 3 TIMES DAILY PRN
Qty: 20 TABLET | Refills: 0 | Status: SHIPPED | OUTPATIENT
Start: 2022-09-19 | End: 2022-09-19 | Stop reason: SDUPTHER

## 2022-09-19 RX ORDER — HYDROCODONE BITARTRATE AND ACETAMINOPHEN 10; 325 MG/1; MG/1
1 TABLET ORAL EVERY 6 HOURS PRN
Qty: 10 TABLET | Refills: 0 | Status: SHIPPED | OUTPATIENT
Start: 2022-09-19

## 2022-09-19 RX ORDER — PHENAZOPYRIDINE HYDROCHLORIDE 200 MG/1
200 TABLET, FILM COATED ORAL 3 TIMES DAILY PRN
Qty: 20 TABLET | Refills: 0 | Status: SHIPPED | OUTPATIENT
Start: 2022-09-19

## 2022-09-19 RX ORDER — ONDANSETRON 4 MG/1
4 TABLET, FILM COATED ORAL EVERY 6 HOURS PRN
Qty: 30 TABLET | Refills: 1 | Status: SHIPPED | OUTPATIENT
Start: 2022-09-19

## 2022-09-19 RX ORDER — GENTAMICIN SULFATE 80 MG/100ML
80 INJECTION, SOLUTION INTRAVENOUS ONCE
Status: CANCELLED | OUTPATIENT
Start: 2022-09-26 | End: 2022-09-19

## 2022-09-19 RX ADMIN — KETOROLAC TROMETHAMINE 60 MG: 30 INJECTION, SOLUTION INTRAMUSCULAR; INTRAVENOUS at 14:05

## 2022-09-19 NOTE — H&P (VIEW-ONLY)
"Chief Complaint:    Chief Complaint   Patient presents with   • Flank Pain     Follow up        Vital Signs:   Ht 175.3 cm (69.02\")   Wt 94.3 kg (208 lb)   BMI 30.70 kg/m²   Body mass index is 30.7 kg/m².      HPI:  Dorita Conte is a 47 y.o. male who presents today for follow up    History of Present Illness  Mr. Conte presents to the clinic for concerns of urinary tract infection.  He was previously seen on 9/12/2022 where a KUB revealed a right distal 4 mm stone near the UVJ.  Patient stated he would like to try to pass stone on his own however he returns today with significant right-sided back/flank pain along with scrotal pain.  Patient states that his pain has become so severe that he would like to try surgical intervention at this time.  Case was discussed with Dr. Garcia and he performed a right ureteroscopy with laser lithotripsy in the outpatient setting on 9/26/2022.  I will send in Bactrim prophylactically for urinary tract infection and culture patient's urine.I will call with results.      Past Medical History:  Past Medical History:   Diagnosis Date   • GERD (gastroesophageal reflux disease)    • History of transfusion    • Sleep apnea     no c-pap       Current Meds:  Current Outpatient Medications   Medication Sig Dispense Refill   • HYDROcodone-acetaminophen (Norco)  MG per tablet Take 1 tablet by mouth Every 6 (Six) Hours As Needed for Moderate Pain. 10 tablet 0   • ondansetron (ZOFRAN) 4 MG tablet Take 1 tablet by mouth Every 6 (Six) Hours As Needed for Nausea or Vomiting. 30 tablet 1   • phenazopyridine (Pyridium) 200 MG tablet Take 1 tablet by mouth 3 (Three) Times a Day As Needed for Bladder Spasms. 20 tablet 0   • sulfamethoxazole-trimethoprim (Bactrim DS) 800-160 MG per tablet Take 1 tablet by mouth 2 (Two) Times a Day. 20 tablet 0   • ciprofloxacin (CIPRO) 500 MG tablet      • ibuprofen (ADVIL,MOTRIN) 800 MG tablet Take 1 tablet by mouth Every 8 (Eight) Hours As Needed for Moderate " Pain. 60 tablet 2   • ketorolac (TORADOL) 10 MG tablet      • oxyCODONE-acetaminophen (Percocet)  MG per tablet Take 1 tablet by mouth Every 4 (Four) Hours As Needed for Moderate Pain. 12 tablet 0   • tamsulosin (FLOMAX) 0.4 MG capsule 24 hr capsule Take 1 capsule by mouth Daily. 30 capsule 3   • Testosterone Cypionate (DEPOTESTOTERONE CYPIONATE) 200 MG/ML injection        No current facility-administered medications for this visit.        Allergies:   Allergies   Allergen Reactions   • Hydrocodone-Acetaminophen Anxiety        Past Surgical History:  Past Surgical History:   Procedure Laterality Date   • BACK SURGERY      cervical instrumentation   • EXTRACORPOREAL SHOCK WAVE LITHOTRIPSY (ESWL) Left 03/25/2022    Procedure: EXTRACORPOREAL SHOCKWAVE LITHOTRIPSY;  Surgeon: Eric Garcia MD;  Location: [facility];  Service: Urology;  Laterality: Left;   • EYE SURGERY     • FRACTURE SURGERY      nasal   • HERNIA REPAIR Left     inguinal   • URETEROSCOPY LASER LITHOTRIPSY WITH STENT INSERTION Left 8/10/2022    Procedure: URETEROSCOPY, RETROGRADE PYELOGRAM;  Surgeon: Eric Garcia MD;  Location: [facility];  Service: Urology;  Laterality: Left;       Social History:  Social History     Socioeconomic History   • Marital status: Single   Tobacco Use   • Smoking status: Never Smoker   • Smokeless tobacco: Never Used   Vaping Use   • Vaping Use: Never used   Substance and Sexual Activity   • Alcohol use: Not Currently     Comment: occasional   • Drug use: Never   • Sexual activity: Defer       Family History:  History reviewed. No pertinent family history.    Review of Systems:  Review of Systems   Constitutional: Negative for chills, fatigue and fever.   HENT: Negative for congestion and sinus pressure.    Respiratory: Negative for chest tightness and shortness of breath.    Cardiovascular: Negative for chest pain.   Gastrointestinal: Positive for nausea. Negative for abdominal pain, constipation,  diarrhea and vomiting.   Genitourinary: Positive for dysuria, flank pain, frequency, penile pain and testicular pain. Negative for hematuria and urgency.   Musculoskeletal: Positive for back pain. Negative for neck pain.   Skin: Negative for rash.   Neurological: Negative for dizziness and headaches.   Hematological: Does not bruise/bleed easily.   Psychiatric/Behavioral: The patient is not nervous/anxious.        Physical Exam:  Physical Exam    Recent Image (CT and/or KUB):   CT Abdomen and Pelvis: No results found for this or any previous visit.     CT Stone Protocol: No results found for this or any previous visit.     KUB: Results for orders placed in visit on 09/12/22    XR abdomen kub    Narrative  EXAM:  XR Abdomen, 1 View    EXAM DATE:  9/12/2022 12:03 PM    CLINICAL HISTORY:  Right flank pain/back pain; N20.0-Calculus of kidney    TECHNIQUE:  Frontal supine view of the abdomen/pelvis.    COMPARISON:  No relevant prior studies available.    FINDINGS:  Gastrointestinal tract:  Unremarkable.  No dilation.  Organs:  Calcification in the left kidney compatible with  nonobstructing stone.  Calcification in the right lower pelvis not seen  previously may represent distal ureter stone. Approximately 4 mm.  Correlate if patient has history of right flank pain.  No additional  stones identified.  Bones/joints:  Unremarkable.  Other findings:  Otherwise unremarkable exam.    Impression  1.  Calcification in the left kidney compatible with nonobstructing  stone.  2.  Calcification in the right lower pelvis not seen previously may  represent distal ureter stone. Approximately 4 mm. Correlate if patient  has history of right flank pain.    This report was finalized on 9/12/2022 12:59 PM by Dr. Yazan Kat MD.       Labs:  Brief Urine Lab Results  (Last result in the past 365 days)      Color   Clarity   Blood   Leuk Est   Nitrite   Protein   CREAT   Urine HCG        08/23/22 1448 Yellow   Clear   Negative    Negative   Negative   Negative               Office Visit on 09/19/2022   Component Date Value Ref Range Status   • Urine Culture 09/19/2022 No growth   Preliminary   Office Visit on 08/23/2022   Component Date Value Ref Range Status   • Color 08/23/2022 Yellow  Yellow, Straw, Dark Yellow, Brenda Final   • Clarity, UA 08/23/2022 Clear  Clear Final   • Specific Gravity  08/23/2022 1.025  1.005 - 1.030 Final   • pH, Urine 08/23/2022 5.5  5.0 - 8.0 Final   • Leukocytes 08/23/2022 Negative  Negative Final   • Nitrite, UA 08/23/2022 Negative  Negative Final   • Protein, POC 08/23/2022 Negative  Negative mg/dL Final   • Glucose, UA 08/23/2022 Negative  Negative mg/dL Final   • Ketones, UA 08/23/2022 Negative  Negative Final   • Urobilinogen, UA 08/23/2022 Normal  Normal, 0.2 E.U./dL Final   • Bilirubin 08/23/2022 Negative  Negative Final   • Blood, UA 08/23/2022 Negative  Negative Final   • Lot Number 08/23/2022 [id number]   Final   • Expiration Date 08/23/2022 122,123   Final   • Calcium 08/23/2022 9.7  8.7 - 10.2 mg/dL Final   • Phosphorus 08/23/2022 3.5  2.8 - 4.1 mg/dL Final   • Creatinine 08/23/2022 0.97  0.76 - 1.27 mg/dL Final   • EGFR Result 08/23/2022 96  >59 mL/min/1.73 Final   • PTH, Intact 08/23/2022 15  15 - 65 pg/mL Final   • PTH, Intact 08/23/2022 Comment   Final    Interpretation                 Intact PTH    Calcium                                  (pg/mL)      (mg/dL)  Normal                          15 - 65     8.6 - 10.2  Primary Hyperparathyroidism         >65          >10.2  Secondary Hyperparathyroidism       >65          <10.2  Non-Parathyroid Hypercalcemia       <65          >10.2  Hypoparathyroidism                  <15          < 8.6  Non-Parathyroid Hypocalcemia    15 - 65          < 8.6   Admission on 08/10/2022, Discharged on 08/10/2022   Component Date Value Ref Range Status   • Stone Source 08/10/2022 Comment   Final    Left Ureter   • Color 08/10/2022 Tan   Final   • Size 08/10/2022 13x7   mm Final    Single piece received.   • Stone Weight 08/10/2022 326  mg Final   • Composition 08/10/2022 Comment   Final    Percentage (Represents the % composition)   • Ca Oxalate-Dihydrate, Stone 08/10/2022 20  % Final   • Ca Hydrogen Phos. 08/10/2022 80  % Final   • Comment 08/10/2022 Comment   Final    Calculus received in liquid. Wet calculi must be dried  before analysis, which delays reporting of results. Leaving  calculi in liquid (such as water, saline, blood, urine) may  lead to changes in composition.   • Photo 08/10/2022 Comment   Final    Photograph will follow under a separate cover   • Comments: 08/10/2022 Comment   Final    Physician questions regarding Calculi Analysis contact  [facility] at: [phone].   • Please note 08/10/2022 Comment   Final    Calculi report will follow via computer, mail or   delivery.   • Disclaimer: 08/10/2022 Comment   Final    This test was developed and its performance characteristics  determined by [employer].  It has not been cleared or approved  by the Food and Drug Administration.   Pre-Admission Testing on 08/09/2022   Component Date Value Ref Range Status   • WBC 08/09/2022 4.58  3.40 - 10.80 10*3/mm3 Final   • RBC 08/09/2022 4.41  4.14 - 5.80 10*6/mm3 Final   • Hemoglobin 08/09/2022 14.5  13.0 - 17.7 g/dL Final   • Hematocrit 08/09/2022 42.6  37.5 - 51.0 % Final   • MCV 08/09/2022 96.6  79.0 - 97.0 fL Final   • MCH 08/09/2022 32.9  26.6 - 33.0 pg Final   • MCHC 08/09/2022 34.0  31.5 - 35.7 g/dL Final   • RDW 08/09/2022 12.9  12.3 - 15.4 % Final   • RDW-SD 08/09/2022 45.8  37.0 - 54.0 fl Final   • MPV 08/09/2022 9.6  6.0 - 12.0 fL Final   • Platelets 08/09/2022 167  140 - 450 10*3/mm3 Final   • Glucose 08/09/2022 104 (A) 65 - 99 mg/dL Final   • BUN 08/09/2022 10  6 - 20 mg/dL Final   • Creatinine 08/09/2022 0.93  0.76 - 1.27 mg/dL Final   • Sodium 08/09/2022 139  136 - 145 mmol/L Final   • Potassium 08/09/2022 3.9  3.5 - 5.2 mmol/L Final   • Chloride 08/09/2022  102  98 - 107 mmol/L Final   • CO2 08/09/2022 28.1  22.0 - 29.0 mmol/L Final   • Calcium 08/09/2022 9.2  8.6 - 10.5 mg/dL Final   • Total Protein 08/09/2022 6.6  6.0 - 8.5 g/dL Final   • Albumin 08/09/2022 4.19  3.50 - 5.20 g/dL Final   • ALT (SGPT) 08/09/2022 19  1 - 41 U/L Final   • AST (SGOT) 08/09/2022 17  1 - 40 U/L Final   • Alkaline Phosphatase 08/09/2022 70  39 - 117 U/L Final   • Total Bilirubin 08/09/2022 0.3  0.0 - 1.2 mg/dL Final   • Globulin 08/09/2022 2.4  gm/dL Final   • A/G Ratio 08/09/2022 1.7  g/dL Final   • BUN/Creatinine Ratio 08/09/2022 10.8  7.0 - 25.0 Final   • Anion Gap 08/09/2022 8.9  5.0 - 15.0 mmol/L Final   • eGFR 08/09/2022 100.7  >60.0 mL/min/1.73 Final    National Kidney Foundation and American Society of Nephrology (ASN) Task Force recommended calculation based on the Chronic Kidney Disease Epidemiology Collaboration (CKD-EPI) equation refit without adjustment for race.        Procedure: None  Procedures     I have reviewed and agree with the above PMH, PSH, FMH, social history, medications, allergies, and labs.     Assessment/Plan:   Problem List Items Addressed This Visit        Genitourinary and Reproductive     Left ureteral stone    Overview     Added automatically from request for surgery [id number]         Relevant Medications    HYDROcodone-acetaminophen (Norco)  MG per tablet    Right ureteral stone    Relevant Medications    ondansetron (ZOFRAN) 4 MG tablet    Other Relevant Orders    Case Request (Completed)    CBC (No Diff)    Basic Metabolic Panel      Other Visit Diagnoses     Right renal stone    -  Primary    Relevant Medications    sulfamethoxazole-trimethoprim (Bactrim DS) 800-160 MG per tablet    phenazopyridine (Pyridium) 200 MG tablet    ketorolac (TORADOL) injection 60 mg (Completed)    Other Relevant Orders    Urine Culture - Urine, Urine, Clean Catch (Completed)          Health Maintenance:   Health Maintenance Due   Topic Date Due   • COLORECTAL CANCER  SCREENING  Never done   • ANNUAL PHYSICAL  Never done   • TDAP/TD VACCINES (1 - Tdap) Never done   • COVID-19 Vaccine (2 - Pfizer series) 11/02/2021   • HEPATITIS C SCREENING  Never done        Smoking Counseling: Patient has never smoked    Urine Incontinence: Patient reports that he is not currently experiencing any symptoms of urinary incontinence.    Patient was given instructions and counseling regarding his condition or for health maintenance advice. Please see specific information pulled into the AVS if appropriate.    Patient Education:   Right ureteral stone -KUB completed on 9/12/2022 reveals a distal right ureteral stone.  Patient states that he is still having significant right back/flank pain along with right scrotal pain.  Patient is concerned for UTI so I will send in Bactrim prophylactically and we will culture urine.  I will call patient with results. We discussed the various therapeutic options available including percutaneous nephrostolithotomy, ureteroscopy and extracorporeal shockwave  lithotripsy.  We discussed the risks of lithotripsy including the passage of stones leading to a 3% chance of Steinstrasse or a large string of stones in the distal ureter. In this incidence the patient was informed that a ureteroscopy is indicated for obstructing fragments.  Patient was informed of an extremely rare incidence of renal hematoma and the significance of this.  Patient was educated on percutaneous nephrostolithotomy and its use as well as the risks and benefits such as the need for postoperative hospitalization, and the risk of damage to the kidney and the remote risk of a nephrectomy.  We also discussed the use of ureteroscopy in the upper tracts and its decreased success rate to completely remove the stones likely causing stent placement leading to an additional procedure for removal.  We discussed the absolute relative indicators for intervention including the presence of sepsis and uncontrollable  pain leading to need for urgent intervention.  We discussed placement of a stent if indicated and the management of the stent as well. Patient states that he would like to undergo surgery for stone removal.  Case was discussed with Dr. Garcia and patient will be scheduled for right ureteroscopy with laser lithotripsy on 9/26/2022.    Visit Diagnoses:    ICD-10-CM ICD-9-CM   1. Right renal stone  N20.0 592.0   2. Right ureteral stone  N20.1 592.1   3. Renal stone  N20.0 592.0       Meds Ordered During Visit:  New Medications Ordered This Visit   Medications   • ondansetron (ZOFRAN) 4 MG tablet     Sig: Take 1 tablet by mouth Every 6 (Six) Hours As Needed for Nausea or Vomiting.     Dispense:  30 tablet     Refill:  1   • sulfamethoxazole-trimethoprim (Bactrim DS) 800-160 MG per tablet     Sig: Take 1 tablet by mouth 2 (Two) Times a Day.     Dispense:  20 tablet     Refill:  0   • phenazopyridine (Pyridium) 200 MG tablet     Sig: Take 1 tablet by mouth 3 (Three) Times a Day As Needed for Bladder Spasms.     Dispense:  20 tablet     Refill:  0   • HYDROcodone-acetaminophen (Norco)  MG per tablet     Sig: Take 1 tablet by mouth Every 6 (Six) Hours As Needed for Moderate Pain.     Dispense:  10 tablet     Refill:  0   • ketorolac (TORADOL) injection 60 mg       Follow Up Appointment: Right ureteroscopy with laser lithotripsy  No follow-ups on file.      This document has been electronically signed by Carlos Sheriff PA-C   September 20, 2022 14:51 EDT    Part of this note may be an electronic transcription/translation of spoken language to printed text using the Dragon Dictation System.

## 2022-09-19 NOTE — PROGRESS NOTES
"Chief Complaint:    Chief Complaint   Patient presents with   • Flank Pain     Follow up        Vital Signs:   Ht 175.3 cm (69.02\")   Wt 94.3 kg (208 lb)   BMI 30.70 kg/m²   Body mass index is 30.7 kg/m².      HPI:  Dorita Conte is a 47 y.o. male who presents today for follow up    History of Present Illness  Mr. Conte presents to the clinic for concerns of urinary tract infection.  He was previously seen on 9/12/2022 where a KUB revealed a right distal 4 mm stone near the UVJ.  Patient stated he would like to try to pass stone on his own however he returns today with significant right-sided back/flank pain along with scrotal pain.  Patient states that his pain has become so severe that he would like to try surgical intervention at this time.  Case was discussed with Dr. Garcia and he performed a right ureteroscopy with laser lithotripsy in the outpatient setting on 9/26/2022.  I will send in Bactrim prophylactically for urinary tract infection and culture patient's urine.I will call with results.      Past Medical History:  Past Medical History:   Diagnosis Date   • GERD (gastroesophageal reflux disease)    • History of transfusion    • Sleep apnea     no c-pap       Current Meds:  Current Outpatient Medications   Medication Sig Dispense Refill   • HYDROcodone-acetaminophen (Norco)  MG per tablet Take 1 tablet by mouth Every 6 (Six) Hours As Needed for Moderate Pain. 10 tablet 0   • ondansetron (ZOFRAN) 4 MG tablet Take 1 tablet by mouth Every 6 (Six) Hours As Needed for Nausea or Vomiting. 30 tablet 1   • phenazopyridine (Pyridium) 200 MG tablet Take 1 tablet by mouth 3 (Three) Times a Day As Needed for Bladder Spasms. 20 tablet 0   • sulfamethoxazole-trimethoprim (Bactrim DS) 800-160 MG per tablet Take 1 tablet by mouth 2 (Two) Times a Day. 20 tablet 0   • ciprofloxacin (CIPRO) 500 MG tablet      • ibuprofen (ADVIL,MOTRIN) 800 MG tablet Take 1 tablet by mouth Every 8 (Eight) Hours As Needed for Moderate " Pain. 60 tablet 2   • ketorolac (TORADOL) 10 MG tablet      • oxyCODONE-acetaminophen (Percocet)  MG per tablet Take 1 tablet by mouth Every 4 (Four) Hours As Needed for Moderate Pain. 12 tablet 0   • tamsulosin (FLOMAX) 0.4 MG capsule 24 hr capsule Take 1 capsule by mouth Daily. 30 capsule 3   • Testosterone Cypionate (DEPOTESTOTERONE CYPIONATE) 200 MG/ML injection        No current facility-administered medications for this visit.        Allergies:   Allergies   Allergen Reactions   • Hydrocodone-Acetaminophen Anxiety        Past Surgical History:  Past Surgical History:   Procedure Laterality Date   • BACK SURGERY      cervical instrumentation   • EXTRACORPOREAL SHOCK WAVE LITHOTRIPSY (ESWL) Left 03/25/2022    Procedure: EXTRACORPOREAL SHOCKWAVE LITHOTRIPSY;  Surgeon: Eric Garcia MD;  Location: [facility];  Service: Urology;  Laterality: Left;   • EYE SURGERY     • FRACTURE SURGERY      nasal   • HERNIA REPAIR Left     inguinal   • URETEROSCOPY LASER LITHOTRIPSY WITH STENT INSERTION Left 8/10/2022    Procedure: URETEROSCOPY, RETROGRADE PYELOGRAM;  Surgeon: Eric Garcia MD;  Location: [facility];  Service: Urology;  Laterality: Left;       Social History:  Social History     Socioeconomic History   • Marital status: Single   Tobacco Use   • Smoking status: Never Smoker   • Smokeless tobacco: Never Used   Vaping Use   • Vaping Use: Never used   Substance and Sexual Activity   • Alcohol use: Not Currently     Comment: occasional   • Drug use: Never   • Sexual activity: Defer       Family History:  History reviewed. No pertinent family history.    Review of Systems:  Review of Systems   Constitutional: Negative for chills, fatigue and fever.   HENT: Negative for congestion and sinus pressure.    Respiratory: Negative for chest tightness and shortness of breath.    Cardiovascular: Negative for chest pain.   Gastrointestinal: Positive for nausea. Negative for abdominal pain, constipation,  diarrhea and vomiting.   Genitourinary: Positive for dysuria, flank pain, frequency, penile pain and testicular pain. Negative for hematuria and urgency.   Musculoskeletal: Positive for back pain. Negative for neck pain.   Skin: Negative for rash.   Neurological: Negative for dizziness and headaches.   Hematological: Does not bruise/bleed easily.   Psychiatric/Behavioral: The patient is not nervous/anxious.        Physical Exam:  Physical Exam    Recent Image (CT and/or KUB):   CT Abdomen and Pelvis: No results found for this or any previous visit.     CT Stone Protocol: No results found for this or any previous visit.     KUB: Results for orders placed in visit on 09/12/22    XR abdomen kub    Narrative  EXAM:  XR Abdomen, 1 View    EXAM DATE:  9/12/2022 12:03 PM    CLINICAL HISTORY:  Right flank pain/back pain; N20.0-Calculus of kidney    TECHNIQUE:  Frontal supine view of the abdomen/pelvis.    COMPARISON:  No relevant prior studies available.    FINDINGS:  Gastrointestinal tract:  Unremarkable.  No dilation.  Organs:  Calcification in the left kidney compatible with  nonobstructing stone.  Calcification in the right lower pelvis not seen  previously may represent distal ureter stone. Approximately 4 mm.  Correlate if patient has history of right flank pain.  No additional  stones identified.  Bones/joints:  Unremarkable.  Other findings:  Otherwise unremarkable exam.    Impression  1.  Calcification in the left kidney compatible with nonobstructing  stone.  2.  Calcification in the right lower pelvis not seen previously may  represent distal ureter stone. Approximately 4 mm. Correlate if patient  has history of right flank pain.    This report was finalized on 9/12/2022 12:59 PM by Dr. Yazan Kat MD.       Labs:  Brief Urine Lab Results  (Last result in the past 365 days)      Color   Clarity   Blood   Leuk Est   Nitrite   Protein   CREAT   Urine HCG        08/23/22 1448 Yellow   Clear   Negative    Negative   Negative   Negative               Office Visit on 09/19/2022   Component Date Value Ref Range Status   • Urine Culture 09/19/2022 No growth   Preliminary   Office Visit on 08/23/2022   Component Date Value Ref Range Status   • Color 08/23/2022 Yellow  Yellow, Straw, Dark Yellow, Brenda Final   • Clarity, UA 08/23/2022 Clear  Clear Final   • Specific Gravity  08/23/2022 1.025  1.005 - 1.030 Final   • pH, Urine 08/23/2022 5.5  5.0 - 8.0 Final   • Leukocytes 08/23/2022 Negative  Negative Final   • Nitrite, UA 08/23/2022 Negative  Negative Final   • Protein, POC 08/23/2022 Negative  Negative mg/dL Final   • Glucose, UA 08/23/2022 Negative  Negative mg/dL Final   • Ketones, UA 08/23/2022 Negative  Negative Final   • Urobilinogen, UA 08/23/2022 Normal  Normal, 0.2 E.U./dL Final   • Bilirubin 08/23/2022 Negative  Negative Final   • Blood, UA 08/23/2022 Negative  Negative Final   • Lot Number 08/23/2022 [id number]   Final   • Expiration Date 08/23/2022 122,123   Final   • Calcium 08/23/2022 9.7  8.7 - 10.2 mg/dL Final   • Phosphorus 08/23/2022 3.5  2.8 - 4.1 mg/dL Final   • Creatinine 08/23/2022 0.97  0.76 - 1.27 mg/dL Final   • EGFR Result 08/23/2022 96  >59 mL/min/1.73 Final   • PTH, Intact 08/23/2022 15  15 - 65 pg/mL Final   • PTH, Intact 08/23/2022 Comment   Final    Interpretation                 Intact PTH    Calcium                                  (pg/mL)      (mg/dL)  Normal                          15 - 65     8.6 - 10.2  Primary Hyperparathyroidism         >65          >10.2  Secondary Hyperparathyroidism       >65          <10.2  Non-Parathyroid Hypercalcemia       <65          >10.2  Hypoparathyroidism                  <15          < 8.6  Non-Parathyroid Hypocalcemia    15 - 65          < 8.6   Admission on 08/10/2022, Discharged on 08/10/2022   Component Date Value Ref Range Status   • Stone Source 08/10/2022 Comment   Final    Left Ureter   • Color 08/10/2022 Tan   Final   • Size 08/10/2022 13x7   mm Final    Single piece received.   • Stone Weight 08/10/2022 326  mg Final   • Composition 08/10/2022 Comment   Final    Percentage (Represents the % composition)   • Ca Oxalate-Dihydrate, Stone 08/10/2022 20  % Final   • Ca Hydrogen Phos. 08/10/2022 80  % Final   • Comment 08/10/2022 Comment   Final    Calculus received in liquid. Wet calculi must be dried  before analysis, which delays reporting of results. Leaving  calculi in liquid (such as water, saline, blood, urine) may  lead to changes in composition.   • Photo 08/10/2022 Comment   Final    Photograph will follow under a separate cover   • Comments: 08/10/2022 Comment   Final    Physician questions regarding Calculi Analysis contact  [employer] at: [phone].   • Please note 08/10/2022 Comment   Final    Calculi report will follow via computer, mail or   delivery.   • Disclaimer: 08/10/2022 Comment   Final    This test was developed and its performance characteristics  determined by [employer].  It has not been cleared or approved  by the Food and Drug Administration.   Pre-Admission Testing on 08/09/2022   Component Date Value Ref Range Status   • WBC 08/09/2022 4.58  3.40 - 10.80 10*3/mm3 Final   • RBC 08/09/2022 4.41  4.14 - 5.80 10*6/mm3 Final   • Hemoglobin 08/09/2022 14.5  13.0 - 17.7 g/dL Final   • Hematocrit 08/09/2022 42.6  37.5 - 51.0 % Final   • MCV 08/09/2022 96.6  79.0 - 97.0 fL Final   • MCH 08/09/2022 32.9  26.6 - 33.0 pg Final   • MCHC 08/09/2022 34.0  31.5 - 35.7 g/dL Final   • RDW 08/09/2022 12.9  12.3 - 15.4 % Final   • RDW-SD 08/09/2022 45.8  37.0 - 54.0 fl Final   • MPV 08/09/2022 9.6  6.0 - 12.0 fL Final   • Platelets 08/09/2022 167  140 - 450 10*3/mm3 Final   • Glucose 08/09/2022 104 (A) 65 - 99 mg/dL Final   • BUN 08/09/2022 10  6 - 20 mg/dL Final   • Creatinine 08/09/2022 0.93  0.76 - 1.27 mg/dL Final   • Sodium 08/09/2022 139  136 - 145 mmol/L Final   • Potassium 08/09/2022 3.9  3.5 - 5.2 mmol/L Final   • Chloride 08/09/2022  102  98 - 107 mmol/L Final   • CO2 08/09/2022 28.1  22.0 - 29.0 mmol/L Final   • Calcium 08/09/2022 9.2  8.6 - 10.5 mg/dL Final   • Total Protein 08/09/2022 6.6  6.0 - 8.5 g/dL Final   • Albumin 08/09/2022 4.19  3.50 - 5.20 g/dL Final   • ALT (SGPT) 08/09/2022 19  1 - 41 U/L Final   • AST (SGOT) 08/09/2022 17  1 - 40 U/L Final   • Alkaline Phosphatase 08/09/2022 70  39 - 117 U/L Final   • Total Bilirubin 08/09/2022 0.3  0.0 - 1.2 mg/dL Final   • Globulin 08/09/2022 2.4  gm/dL Final   • A/G Ratio 08/09/2022 1.7  g/dL Final   • BUN/Creatinine Ratio 08/09/2022 10.8  7.0 - 25.0 Final   • Anion Gap 08/09/2022 8.9  5.0 - 15.0 mmol/L Final   • eGFR 08/09/2022 100.7  >60.0 mL/min/1.73 Final    National Kidney Foundation and American Society of Nephrology (ASN) Task Force recommended calculation based on the Chronic Kidney Disease Epidemiology Collaboration (CKD-EPI) equation refit without adjustment for race.        Procedure: None  Procedures     I have reviewed and agree with the above PMH, PSH, FMH, social history, medications, allergies, and labs.     Assessment/Plan:   Problem List Items Addressed This Visit        Genitourinary and Reproductive     Left ureteral stone    Overview     Added automatically from request for surgery [id number]         Relevant Medications    HYDROcodone-acetaminophen (Norco)  MG per tablet    Right ureteral stone    Relevant Medications    ondansetron (ZOFRAN) 4 MG tablet    Other Relevant Orders    Case Request (Completed)    CBC (No Diff)    Basic Metabolic Panel      Other Visit Diagnoses     Right renal stone    -  Primary    Relevant Medications    sulfamethoxazole-trimethoprim (Bactrim DS) 800-160 MG per tablet    phenazopyridine (Pyridium) 200 MG tablet    ketorolac (TORADOL) injection 60 mg (Completed)    Other Relevant Orders    Urine Culture - Urine, Urine, Clean Catch (Completed)          Health Maintenance:   Health Maintenance Due   Topic Date Due   • COLORECTAL CANCER  SCREENING  Never done   • ANNUAL PHYSICAL  Never done   • TDAP/TD VACCINES (1 - Tdap) Never done   • COVID-19 Vaccine (2 - Pfizer series) 11/02/2021   • HEPATITIS C SCREENING  Never done        Smoking Counseling: Patient has never smoked    Urine Incontinence: Patient reports that he is not currently experiencing any symptoms of urinary incontinence.    Patient was given instructions and counseling regarding his condition or for health maintenance advice. Please see specific information pulled into the AVS if appropriate.    Patient Education:   Right ureteral stone -KUB completed on 9/12/2022 reveals a distal right ureteral stone.  Patient states that he is still having significant right back/flank pain along with right scrotal pain.  Patient is concerned for UTI so I will send in Bactrim prophylactically and we will culture urine.  I will call patient with results. We discussed the various therapeutic options available including percutaneous nephrostolithotomy, ureteroscopy and extracorporeal shockwave  lithotripsy.  We discussed the risks of lithotripsy including the passage of stones leading to a 3% chance of Steinstrasse or a large string of stones in the distal ureter. In this incidence the patient was informed that a ureteroscopy is indicated for obstructing fragments.  Patient was informed of an extremely rare incidence of renal hematoma and the significance of this.  Patient was educated on percutaneous nephrostolithotomy and its use as well as the risks and benefits such as the need for postoperative hospitalization, and the risk of damage to the kidney and the remote risk of a nephrectomy.  We also discussed the use of ureteroscopy in the upper tracts and its decreased success rate to completely remove the stones likely causing stent placement leading to an additional procedure for removal.  We discussed the absolute relative indicators for intervention including the presence of sepsis and uncontrollable  pain leading to need for urgent intervention.  We discussed placement of a stent if indicated and the management of the stent as well. Patient states that he would like to undergo surgery for stone removal.  Case was discussed with Dr. Garcia and patient will be scheduled for right ureteroscopy with laser lithotripsy on 9/26/2022.    Visit Diagnoses:    ICD-10-CM ICD-9-CM   1. Right renal stone  N20.0 592.0   2. Right ureteral stone  N20.1 592.1   3. Renal stone  N20.0 592.0       Meds Ordered During Visit:  New Medications Ordered This Visit   Medications   • ondansetron (ZOFRAN) 4 MG tablet     Sig: Take 1 tablet by mouth Every 6 (Six) Hours As Needed for Nausea or Vomiting.     Dispense:  30 tablet     Refill:  1   • sulfamethoxazole-trimethoprim (Bactrim DS) 800-160 MG per tablet     Sig: Take 1 tablet by mouth 2 (Two) Times a Day.     Dispense:  20 tablet     Refill:  0   • phenazopyridine (Pyridium) 200 MG tablet     Sig: Take 1 tablet by mouth 3 (Three) Times a Day As Needed for Bladder Spasms.     Dispense:  20 tablet     Refill:  0   • HYDROcodone-acetaminophen (Norco)  MG per tablet     Sig: Take 1 tablet by mouth Every 6 (Six) Hours As Needed for Moderate Pain.     Dispense:  10 tablet     Refill:  0   • ketorolac (TORADOL) injection 60 mg       Follow Up Appointment: Right ureteroscopy with laser lithotripsy  No follow-ups on file.      This document has been electronically signed by Carlos Sheriff PA-C   September 20, 2022 14:51 EDT    Part of this note may be an electronic transcription/translation of spoken language to printed text using the Dragon Dictation System.

## 2022-09-20 PROBLEM — N20.1 RIGHT URETERAL STONE: Status: ACTIVE | Noted: 2022-09-20

## 2022-09-20 LAB — BACTERIA SPEC AEROBE CULT: NO GROWTH

## 2022-09-23 ENCOUNTER — PRE-ADMISSION TESTING (OUTPATIENT)
Dept: PREADMISSION TESTING | Facility: HOSPITAL | Age: 47
End: 2022-09-23

## 2022-09-23 VITALS — HEIGHT: 69 IN | BODY MASS INDEX: 31.1 KG/M2 | WEIGHT: 210 LBS

## 2022-09-23 DIAGNOSIS — N20.1 RIGHT URETERAL STONE: ICD-10-CM

## 2022-09-23 LAB
ANION GAP SERPL CALCULATED.3IONS-SCNC: 11.6 MMOL/L (ref 5–15)
BUN SERPL-MCNC: 12 MG/DL (ref 6–20)
BUN/CREAT SERPL: 12.1 (ref 7–25)
CALCIUM SPEC-SCNC: 9.5 MG/DL (ref 8.6–10.5)
CHLORIDE SERPL-SCNC: 102 MMOL/L (ref 98–107)
CO2 SERPL-SCNC: 26.4 MMOL/L (ref 22–29)
CREAT SERPL-MCNC: 0.99 MG/DL (ref 0.76–1.27)
DEPRECATED RDW RBC AUTO: 43.6 FL (ref 37–54)
EGFRCR SERPLBLD CKD-EPI 2021: 93.4 ML/MIN/1.73
ERYTHROCYTE [DISTWIDTH] IN BLOOD BY AUTOMATED COUNT: 12.5 % (ref 12.3–15.4)
GLUCOSE SERPL-MCNC: 105 MG/DL (ref 65–99)
HCT VFR BLD AUTO: 43 % (ref 37.5–51)
HGB BLD-MCNC: 14.8 G/DL (ref 13–17.7)
MCH RBC QN AUTO: 32.7 PG (ref 26.6–33)
MCHC RBC AUTO-ENTMCNC: 34.4 G/DL (ref 31.5–35.7)
MCV RBC AUTO: 94.9 FL (ref 79–97)
PLATELET # BLD AUTO: 170 10*3/MM3 (ref 140–450)
PMV BLD AUTO: 9.9 FL (ref 6–12)
POTASSIUM SERPL-SCNC: 4 MMOL/L (ref 3.5–5.2)
RBC # BLD AUTO: 4.53 10*6/MM3 (ref 4.14–5.8)
SODIUM SERPL-SCNC: 140 MMOL/L (ref 136–145)
WBC NRBC COR # BLD: 5.09 10*3/MM3 (ref 3.4–10.8)

## 2022-09-23 PROCEDURE — 85027 COMPLETE CBC AUTOMATED: CPT

## 2022-09-23 PROCEDURE — 80048 BASIC METABOLIC PNL TOTAL CA: CPT

## 2022-09-23 PROCEDURE — 36415 COLL VENOUS BLD VENIPUNCTURE: CPT

## 2022-09-23 NOTE — DISCHARGE INSTRUCTIONS

## 2022-09-26 ENCOUNTER — HOSPITAL ENCOUNTER (OUTPATIENT)
Facility: HOSPITAL | Age: 47
Setting detail: HOSPITAL OUTPATIENT SURGERY
Discharge: HOME | End: 2022-09-26
Attending: UROLOGY | Admitting: UROLOGY

## 2022-09-26 ENCOUNTER — ANESTHESIA (OUTPATIENT)
Dept: PERIOP | Facility: HOSPITAL | Age: 47
End: 2022-09-26

## 2022-09-26 ENCOUNTER — APPOINTMENT (OUTPATIENT)
Dept: GENERAL RADIOLOGY | Facility: HOSPITAL | Age: 47
End: 2022-09-26

## 2022-09-26 ENCOUNTER — ANESTHESIA EVENT (OUTPATIENT)
Dept: PERIOP | Facility: HOSPITAL | Age: 47
End: 2022-09-26

## 2022-09-26 VITALS
TEMPERATURE: 97.6 F | HEART RATE: 60 BPM | BODY MASS INDEX: 30.51 KG/M2 | OXYGEN SATURATION: 94 % | RESPIRATION RATE: 18 BRPM | WEIGHT: 206 LBS | DIASTOLIC BLOOD PRESSURE: 78 MMHG | HEIGHT: 69 IN | SYSTOLIC BLOOD PRESSURE: 112 MMHG

## 2022-09-26 DIAGNOSIS — N20.1 RIGHT URETERAL STONE: ICD-10-CM

## 2022-09-26 PROCEDURE — 25010000002 GENTAMICIN PER 80 MG

## 2022-09-26 PROCEDURE — 25010000002 PROPOFOL 10 MG/ML EMULSION: Performed by: NURSE ANESTHETIST, CERTIFIED REGISTERED

## 2022-09-26 PROCEDURE — 25010000002 ONDANSETRON PER 1 MG: Performed by: NURSE ANESTHETIST, CERTIFIED REGISTERED

## 2022-09-26 PROCEDURE — 25010000002 MIDAZOLAM PER 1MG: Performed by: NURSE ANESTHETIST, CERTIFIED REGISTERED

## 2022-09-26 PROCEDURE — 52353 CYSTOURETERO W/LITHOTRIPSY: CPT | Performed by: UROLOGY

## 2022-09-26 PROCEDURE — 25010000002 FENTANYL CITRATE (PF) 50 MCG/ML SOLUTION: Performed by: NURSE ANESTHETIST, CERTIFIED REGISTERED

## 2022-09-26 PROCEDURE — 25010000002 IOPAMIDOL 61 % SOLUTION

## 2022-09-26 PROCEDURE — 76000 FLUOROSCOPY <1 HR PHYS/QHP: CPT

## 2022-09-26 PROCEDURE — 76000 FLUOROSCOPY <1 HR PHYS/QHP: CPT | Performed by: RADIOLOGY

## 2022-09-26 RX ORDER — LIDOCAINE HYDROCHLORIDE 20 MG/ML
INJECTION, SOLUTION EPIDURAL; INFILTRATION; INTRACAUDAL; PERINEURAL AS NEEDED
Status: DISCONTINUED | OUTPATIENT
Start: 2022-09-26 | End: 2022-09-26 | Stop reason: SURG

## 2022-09-26 RX ORDER — PROMETHAZINE HYDROCHLORIDE 25 MG/1
25 TABLET ORAL EVERY 6 HOURS PRN
Qty: 21 TABLET | Refills: 2 | Status: SHIPPED | OUTPATIENT
Start: 2022-09-26

## 2022-09-26 RX ORDER — KETOROLAC TROMETHAMINE 30 MG/ML
30 INJECTION, SOLUTION INTRAMUSCULAR; INTRAVENOUS EVERY 6 HOURS PRN
Status: DISCONTINUED | OUTPATIENT
Start: 2022-09-26 | End: 2022-09-26 | Stop reason: HOSPADM

## 2022-09-26 RX ORDER — PROPOFOL 10 MG/ML
VIAL (ML) INTRAVENOUS AS NEEDED
Status: DISCONTINUED | OUTPATIENT
Start: 2022-09-26 | End: 2022-09-26 | Stop reason: SURG

## 2022-09-26 RX ORDER — OXYCODONE AND ACETAMINOPHEN 10; 325 MG/1; MG/1
1 TABLET ORAL EVERY 8 HOURS PRN
Qty: 16 TABLET | Refills: 0 | Status: SHIPPED | OUTPATIENT
Start: 2022-09-26

## 2022-09-26 RX ORDER — MIDAZOLAM HYDROCHLORIDE 1 MG/ML
1 INJECTION INTRAMUSCULAR; INTRAVENOUS
Status: DISCONTINUED | OUTPATIENT
Start: 2022-09-26 | End: 2022-09-26 | Stop reason: HOSPADM

## 2022-09-26 RX ORDER — ONDANSETRON 2 MG/ML
INJECTION INTRAMUSCULAR; INTRAVENOUS AS NEEDED
Status: DISCONTINUED | OUTPATIENT
Start: 2022-09-26 | End: 2022-09-26 | Stop reason: SURG

## 2022-09-26 RX ORDER — OMEPRAZOLE 20 MG/1
20 CAPSULE, DELAYED RELEASE ORAL DAILY
COMMUNITY

## 2022-09-26 RX ORDER — SODIUM CHLORIDE 0.9 % (FLUSH) 0.9 %
10 SYRINGE (ML) INJECTION AS NEEDED
Status: DISCONTINUED | OUTPATIENT
Start: 2022-09-26 | End: 2022-09-26 | Stop reason: HOSPADM

## 2022-09-26 RX ORDER — SODIUM CHLORIDE, SODIUM LACTATE, POTASSIUM CHLORIDE, CALCIUM CHLORIDE 600; 310; 30; 20 MG/100ML; MG/100ML; MG/100ML; MG/100ML
100 INJECTION, SOLUTION INTRAVENOUS ONCE AS NEEDED
Status: DISCONTINUED | OUTPATIENT
Start: 2022-09-26 | End: 2022-09-26 | Stop reason: HOSPADM

## 2022-09-26 RX ORDER — OXYCODONE HYDROCHLORIDE AND ACETAMINOPHEN 5; 325 MG/1; MG/1
1 TABLET ORAL ONCE AS NEEDED
Status: DISCONTINUED | OUTPATIENT
Start: 2022-09-26 | End: 2022-09-26 | Stop reason: HOSPADM

## 2022-09-26 RX ORDER — CETIRIZINE HYDROCHLORIDE 10 MG/1
10 TABLET ORAL DAILY
COMMUNITY

## 2022-09-26 RX ORDER — GENTAMICIN SULFATE 80 MG/100ML
80 INJECTION, SOLUTION INTRAVENOUS ONCE
Status: COMPLETED | OUTPATIENT
Start: 2022-09-26 | End: 2022-09-26

## 2022-09-26 RX ORDER — FENTANYL CITRATE 50 UG/ML
INJECTION, SOLUTION INTRAMUSCULAR; INTRAVENOUS AS NEEDED
Status: DISCONTINUED | OUTPATIENT
Start: 2022-09-26 | End: 2022-09-26 | Stop reason: SURG

## 2022-09-26 RX ORDER — ONDANSETRON 2 MG/ML
4 INJECTION INTRAMUSCULAR; INTRAVENOUS AS NEEDED
Status: DISCONTINUED | OUTPATIENT
Start: 2022-09-26 | End: 2022-09-26 | Stop reason: HOSPADM

## 2022-09-26 RX ORDER — MEPERIDINE HYDROCHLORIDE 25 MG/ML
12.5 INJECTION INTRAMUSCULAR; INTRAVENOUS; SUBCUTANEOUS
Status: DISCONTINUED | OUTPATIENT
Start: 2022-09-26 | End: 2022-09-26 | Stop reason: HOSPADM

## 2022-09-26 RX ORDER — MAGNESIUM HYDROXIDE 1200 MG/15ML
LIQUID ORAL AS NEEDED
Status: DISCONTINUED | OUTPATIENT
Start: 2022-09-26 | End: 2022-09-26 | Stop reason: HOSPADM

## 2022-09-26 RX ORDER — SODIUM CHLORIDE, SODIUM LACTATE, POTASSIUM CHLORIDE, CALCIUM CHLORIDE 600; 310; 30; 20 MG/100ML; MG/100ML; MG/100ML; MG/100ML
INJECTION, SOLUTION INTRAVENOUS CONTINUOUS PRN
Status: DISCONTINUED | OUTPATIENT
Start: 2022-09-26 | End: 2022-09-26 | Stop reason: SURG

## 2022-09-26 RX ORDER — SODIUM CHLORIDE, SODIUM LACTATE, POTASSIUM CHLORIDE, CALCIUM CHLORIDE 600; 310; 30; 20 MG/100ML; MG/100ML; MG/100ML; MG/100ML
125 INJECTION, SOLUTION INTRAVENOUS ONCE
Status: COMPLETED | OUTPATIENT
Start: 2022-09-26 | End: 2022-09-26

## 2022-09-26 RX ORDER — FENTANYL CITRATE 50 UG/ML
50 INJECTION, SOLUTION INTRAMUSCULAR; INTRAVENOUS
Status: DISCONTINUED | OUTPATIENT
Start: 2022-09-26 | End: 2022-09-26 | Stop reason: HOSPADM

## 2022-09-26 RX ORDER — SODIUM CHLORIDE 0.9 % (FLUSH) 0.9 %
10 SYRINGE (ML) INJECTION EVERY 12 HOURS SCHEDULED
Status: DISCONTINUED | OUTPATIENT
Start: 2022-09-26 | End: 2022-09-26 | Stop reason: HOSPADM

## 2022-09-26 RX ORDER — FAMOTIDINE 10 MG/ML
INJECTION, SOLUTION INTRAVENOUS AS NEEDED
Status: DISCONTINUED | OUTPATIENT
Start: 2022-09-26 | End: 2022-09-26 | Stop reason: SURG

## 2022-09-26 RX ORDER — MIDAZOLAM HYDROCHLORIDE 2 MG/2ML
INJECTION, SOLUTION INTRAMUSCULAR; INTRAVENOUS AS NEEDED
Status: DISCONTINUED | OUTPATIENT
Start: 2022-09-26 | End: 2022-09-26 | Stop reason: SURG

## 2022-09-26 RX ORDER — IPRATROPIUM BROMIDE AND ALBUTEROL SULFATE 2.5; .5 MG/3ML; MG/3ML
3 SOLUTION RESPIRATORY (INHALATION) ONCE AS NEEDED
Status: DISCONTINUED | OUTPATIENT
Start: 2022-09-26 | End: 2022-09-26 | Stop reason: HOSPADM

## 2022-09-26 RX ADMIN — SODIUM CHLORIDE, POTASSIUM CHLORIDE, SODIUM LACTATE AND CALCIUM CHLORIDE: 600; 310; 30; 20 INJECTION, SOLUTION INTRAVENOUS at 10:11

## 2022-09-26 RX ADMIN — SODIUM CHLORIDE, POTASSIUM CHLORIDE, SODIUM LACTATE AND CALCIUM CHLORIDE 125 ML/HR: 600; 310; 30; 20 INJECTION, SOLUTION INTRAVENOUS at 09:02

## 2022-09-26 RX ADMIN — FENTANYL CITRATE 100 MCG: 50 INJECTION INTRAMUSCULAR; INTRAVENOUS at 10:12

## 2022-09-26 RX ADMIN — ONDANSETRON 4 MG: 2 INJECTION INTRAMUSCULAR; INTRAVENOUS at 10:24

## 2022-09-26 RX ADMIN — GENTAMICIN SULFATE 80 MG: 80 INJECTION, SOLUTION INTRAVENOUS at 10:11

## 2022-09-26 RX ADMIN — PROPOFOL 200 MG: 10 INJECTION, EMULSION INTRAVENOUS at 10:16

## 2022-09-26 RX ADMIN — MIDAZOLAM HYDROCHLORIDE 2 MG: 1 INJECTION, SOLUTION INTRAMUSCULAR; INTRAVENOUS at 10:12

## 2022-09-26 RX ADMIN — FAMOTIDINE 20 MG: 10 INJECTION, SOLUTION INTRAVENOUS at 10:12

## 2022-09-26 RX ADMIN — LIDOCAINE HYDROCHLORIDE 40 MG: 20 INJECTION, SOLUTION EPIDURAL; INFILTRATION; INTRACAUDAL; PERINEURAL at 10:16

## 2022-09-26 NOTE — OP NOTE
Dorita Conte  9/26/2022    Pre-op Diagnosis:   Right ureteral stone [N20.1]    Post-op Diagnosis:     Post-Op Diagnosis Codes:     * Right ureteral stone [N20.1]    Procedure/CPT® Codes:  47-year-old white male with very metabolically active stone disease presents with a right 6 mm distal stone causing severe pain and on passage he is desirous of surgical intervention following an informed consent brought the operative suite prepped and draped in a low dorsolithotomy position I used the Cook 4.5 Thai ureteroscope advanced atraumatically through the urethra identified the orifice identified a stone at the upper outer aspect used the laser broke it numerous tiny pieces and extracted all pieces retrograde showed good free flow no hydronephrosis no complications or problems particularly no devastation I did not leave a stent    Procedure(s):  URETEROSCOPY LASER LITHOTRIPSY and retrograde pyelogram    Surgeon(s):  Eric Garcia MD    Anesthesia: see anesthesia record    Staff:   Circulator: Renetta Hunt RN; Dennis Powell RN  Scrub Person: Iveth Mora LPN; Leyda Green    Estimated Blood Loss: none  Urine Voided: * No values recorded between 9/26/2022 10:11 AM and 9/26/2022 10:37 AM *    Specimens:                None      Drains: None    Findings: Calcium oxalate dihydrate distal ureteral calculus    Blood: N/A    Complications: None    Grafts and Implants: None    Eric Garcia MD     Date: 9/26/2022  Time: 10:45 EDT

## 2022-09-26 NOTE — ANESTHESIA PROCEDURE NOTES
Airway  Urgency: elective    Date/Time: 9/26/2022 10:16 AM  Airway not difficult    General Information and Staff    Patient location during procedure: OR  Anesthesiologist: Chepe Rdz MD  CRNA/CAA: Melissa Frazier CRNA    Indications and Patient Condition  Indications for airway management: airway protection    Preoxygenated: yes  Mask difficulty assessment: 0 - not attempted    Final Airway Details  Final airway type: supraglottic airway      Successful airway: classic  Size 4    Number of attempts at approach: 1  Assessment: lips, teeth, and gum same as pre-op    Additional Comments  LMA placed with no trauma noted. Patient tolerated well. Good seal. Secured.

## 2022-10-10 ENCOUNTER — OFFICE VISIT (OUTPATIENT)
Dept: UROLOGY | Facility: CLINIC | Age: 47
End: 2022-10-10

## 2022-10-10 VITALS — HEIGHT: 69 IN | BODY MASS INDEX: 30.51 KG/M2 | WEIGHT: 206 LBS

## 2022-10-10 DIAGNOSIS — N20.1 RIGHT URETERAL STONE: Primary | ICD-10-CM

## 2022-10-10 PROCEDURE — 99213 OFFICE O/P EST LOW 20 MIN: CPT

## 2022-10-10 NOTE — PROGRESS NOTES
"Chief Complaint:    Chief Complaint   Patient presents with   • right kidney stone        Vital Signs:   Ht 175.3 cm (69.02\")   Wt 93.4 kg (206 lb)   BMI 30.41 kg/m²   Body mass index is 30.41 kg/m².      HPI:  Dorita Conte is a 47 y.o. male who presents today for follow up    History of Present Illness  Mr. Conte presents for a 1 month follow-up for nephrolithiasis.  Patient underwent right ureteroscopy for a distal 4 mm UVJ stone.  Stone analysis in August showed 80% Calcium hydrogen phosphate with 20% calcium oxalate dihydrate.  Patient reports improvement of pain.  He complains of some frequency and urgency.  He reports holding his bladder for long periods of time at work.  He continues to take Flomax daily.  Denies any difficulty urinating, dysuria, gross hematuria, fever, chills, or dysuria.  Patient is unable to give urine sample in office today.      Past Medical History:  Past Medical History:   Diagnosis Date   • GERD (gastroesophageal reflux disease)    • History of transfusion     [facility]   • Kidney stones    • Sleep apnea     no c-pap       Current Meds:  Current Outpatient Medications   Medication Sig Dispense Refill   • cetirizine (zyrTEC) 10 MG tablet Take 10 mg by mouth Daily.     • ibuprofen (ADVIL,MOTRIN) 800 MG tablet Take 1 tablet by mouth Every 8 (Eight) Hours As Needed for Moderate Pain. 60 tablet 2   • ketorolac (TORADOL) 10 MG tablet      • omeprazole (priLOSEC) 20 MG capsule Take 20 mg by mouth Daily.     • ondansetron (ZOFRAN) 4 MG tablet Take 1 tablet by mouth Every 6 (Six) Hours As Needed for Nausea or Vomiting. 30 tablet 1   • phenazopyridine (Pyridium) 200 MG tablet Take 1 tablet by mouth 3 (Three) Times a Day As Needed for Bladder Spasms. 20 tablet 0   • promethazine (PHENERGAN) 25 MG tablet Take 1 tablet by mouth Every 6 (Six) Hours As Needed for Nausea or Vomiting. 21 tablet 2   • tamsulosin (FLOMAX) 0.4 MG capsule 24 hr capsule Take 1 capsule by mouth Daily. 30 capsule 3 "   • Testosterone Cypionate (DEPOTESTOTERONE CYPIONATE) 200 MG/ML injection Inject 200 mg into the appropriate muscle as directed by prescriber Every 14 (Fourteen) Days.     • ciprofloxacin (CIPRO) 500 MG tablet      • HYDROcodone-acetaminophen (Norco)  MG per tablet Take 1 tablet by mouth Every 6 (Six) Hours As Needed for Moderate Pain. 10 tablet 0   • oxyCODONE-acetaminophen (Percocet)  MG per tablet Take 1 tablet by mouth Every 4 (Four) Hours As Needed for Moderate Pain. 12 tablet 0   • oxyCODONE-acetaminophen (Percocet)  MG per tablet Take 1 tablet by mouth Every 8 (Eight) Hours As Needed for Moderate Pain. 16 tablet 0   • sulfamethoxazole-trimethoprim (Bactrim DS) 800-160 MG per tablet Take 1 tablet by mouth 2 (Two) Times a Day. 20 tablet 0     No current facility-administered medications for this visit.        Allergies:   No Known Allergies     Past Surgical History:  Past Surgical History:   Procedure Laterality Date   • BACK SURGERY      cervical instrumentation   • EXTRACORPOREAL SHOCK WAVE LITHOTRIPSY (ESWL) Left 03/25/2022    Procedure: EXTRACORPOREAL SHOCKWAVE LITHOTRIPSY;  Surgeon: Eric Garcia MD;  Location: [facility];  Service: Urology;  Laterality: Left;   • EYE SURGERY      Lasik   • FRACTURE SURGERY      nasal   • HERNIA REPAIR Left     inguinal   • URETEROSCOPY LASER LITHOTRIPSY WITH STENT INSERTION Left 08/10/2022    Procedure: URETEROSCOPY, RETROGRADE PYELOGRAM;  Surgeon: Eric Garcia MD;  Location: [facility];  Service: Urology;  Laterality: Left;   • URETEROSCOPY LASER LITHOTRIPSY WITH STENT INSERTION Right 9/26/2022    Procedure: URETEROSCOPY LASER LITHOTRIPSY;  Surgeon: Eric Garcia MD;  Location: [facility];  Service: Urology;  Laterality: Right;       Social History:  Social History     Socioeconomic History   • Marital status: Single   Tobacco Use   • Smoking status: Never   • Smokeless tobacco: Never   Vaping Use   • Vaping Use: Never used    Substance and Sexual Activity   • Alcohol use: Yes     Comment: occasional   • Drug use: Never   • Sexual activity: Defer       Family History:  History reviewed. No pertinent family history.    Review of Systems:  Review of Systems   Constitutional: Negative for chills, fatigue and fever.   HENT: Negative for congestion and sinus pressure.    Respiratory: Negative for chest tightness and shortness of breath.    Cardiovascular: Negative for chest pain.   Gastrointestinal: Negative for abdominal pain, constipation, diarrhea, nausea and vomiting.   Genitourinary: Positive for frequency and urgency. Negative for difficulty urinating, dysuria, flank pain, hematuria, penile pain and testicular pain.   Musculoskeletal: Negative for back pain and neck pain.   Skin: Negative for rash.   Neurological: Negative for dizziness and headaches.   Hematological: Does not bruise/bleed easily.   Psychiatric/Behavioral: The patient is not nervous/anxious.        Physical Exam:  Physical Exam  Constitutional:       General: He is not in acute distress.     Appearance: Normal appearance.   HENT:      Head: Normocephalic and atraumatic.      Nose: Nose normal.      Mouth/Throat:      Mouth: Mucous membranes are moist.   Eyes:      Conjunctiva/sclera: Conjunctivae normal.   Cardiovascular:      Rate and Rhythm: Normal rate and regular rhythm.      Pulses: Normal pulses.      Heart sounds: Normal heart sounds.   Pulmonary:      Effort: Pulmonary effort is normal.      Breath sounds: Normal breath sounds.   Abdominal:      General: Bowel sounds are normal.      Palpations: Abdomen is soft.   Musculoskeletal:         General: Normal range of motion.      Cervical back: Normal range of motion.   Skin:     General: Skin is warm.   Neurological:      General: No focal deficit present.      Mental Status: He is alert and oriented to person, place, and time.   Psychiatric:         Mood and Affect: Mood normal.         Behavior: Behavior normal.          Thought Content: Thought content normal.         Judgment: Judgment normal.         Recent Image (CT and/or KUB):   CT Abdomen and Pelvis: No results found for this or any previous visit.     CT Stone Protocol: No results found for this or any previous visit.     KUB: Results for orders placed in visit on 09/12/22    XR abdomen kub    Narrative  EXAM:  XR Abdomen, 1 View    EXAM DATE:  9/12/2022 12:03 PM    CLINICAL HISTORY:  Right flank pain/back pain; N20.0-Calculus of kidney    TECHNIQUE:  Frontal supine view of the abdomen/pelvis.    COMPARISON:  No relevant prior studies available.    FINDINGS:  Gastrointestinal tract:  Unremarkable.  No dilation.  Organs:  Calcification in the left kidney compatible with  nonobstructing stone.  Calcification in the right lower pelvis not seen  previously may represent distal ureter stone. Approximately 4 mm.  Correlate if patient has history of right flank pain.  No additional  stones identified.  Bones/joints:  Unremarkable.  Other findings:  Otherwise unremarkable exam.    Impression  1.  Calcification in the left kidney compatible with nonobstructing  stone.  2.  Calcification in the right lower pelvis not seen previously may  represent distal ureter stone. Approximately 4 mm. Correlate if patient  has history of right flank pain.    This report was finalized on 9/12/2022 12:59 PM by Dr. Yazan Kat MD.       Labs:  Brief Urine Lab Results  (Last result in the past 365 days)      Color   Clarity   Blood   Leuk Est   Nitrite   Protein   CREAT   Urine HCG        08/23/22 1448 Yellow   Clear   Negative   Negative   Negative   Negative               Pre-Admission Testing on 09/23/2022   Component Date Value Ref Range Status   • WBC 09/23/2022 5.09  3.40 - 10.80 10*3/mm3 Final   • RBC 09/23/2022 4.53  4.14 - 5.80 10*6/mm3 Final   • Hemoglobin 09/23/2022 14.8  13.0 - 17.7 g/dL Final   • Hematocrit 09/23/2022 43.0  37.5 - 51.0 % Final   • MCV 09/23/2022 94.9  79.0 -  97.0 fL Final   • MCH 09/23/2022 32.7  26.6 - 33.0 pg Final   • MCHC 09/23/2022 34.4  31.5 - 35.7 g/dL Final   • RDW 09/23/2022 12.5  12.3 - 15.4 % Final   • RDW-SD 09/23/2022 43.6  37.0 - 54.0 fl Final   • MPV 09/23/2022 9.9  6.0 - 12.0 fL Final   • Platelets 09/23/2022 170  140 - 450 10*3/mm3 Final   • Glucose 09/23/2022 105 (H)  65 - 99 mg/dL Final   • BUN 09/23/2022 12  6 - 20 mg/dL Final   • Creatinine 09/23/2022 0.99  0.76 - 1.27 mg/dL Final   • Sodium 09/23/2022 140  136 - 145 mmol/L Final   • Potassium 09/23/2022 4.0  3.5 - 5.2 mmol/L Final   • Chloride 09/23/2022 102  98 - 107 mmol/L Final   • CO2 09/23/2022 26.4  22.0 - 29.0 mmol/L Final   • Calcium 09/23/2022 9.5  8.6 - 10.5 mg/dL Final   • BUN/Creatinine Ratio 09/23/2022 12.1  7.0 - 25.0 Final   • Anion Gap 09/23/2022 11.6  5.0 - 15.0 mmol/L Final   • eGFR 09/23/2022 93.4  >60.0 mL/min/1.73 Final    National Kidney Foundation and American Society of Nephrology (ASN) Task Force recommended calculation based on the Chronic Kidney Disease Epidemiology Collaboration (CKD-EPI) equation refit without adjustment for race.   Office Visit on 09/19/2022   Component Date Value Ref Range Status   • Urine Culture 09/19/2022 No growth   Final   Office Visit on 08/23/2022   Component Date Value Ref Range Status   • Color 08/23/2022 Yellow  Yellow, Straw, Dark Yellow, Brenda Final   • Clarity, UA 08/23/2022 Clear  Clear Final   • Specific Gravity  08/23/2022 1.025  1.005 - 1.030 Final   • pH, Urine 08/23/2022 5.5  5.0 - 8.0 Final   • Leukocytes 08/23/2022 Negative  Negative Final   • Nitrite, UA 08/23/2022 Negative  Negative Final   • Protein, POC 08/23/2022 Negative  Negative mg/dL Final   • Glucose, UA 08/23/2022 Negative  Negative mg/dL Final   • Ketones, UA 08/23/2022 Negative  Negative Final   • Urobilinogen, UA 08/23/2022 Normal  Normal, 0.2 E.U./dL Final   • Bilirubin 08/23/2022 Negative  Negative Final   • Blood, UA 08/23/2022 Negative  Negative Final   • Lot  Number 08/23/2022 [id number]   Final   • Expiration Date 08/23/2022 122,123   Final   • Calcium 08/23/2022 9.7  8.7 - 10.2 mg/dL Final   • Phosphorus 08/23/2022 3.5  2.8 - 4.1 mg/dL Final   • Creatinine 08/23/2022 0.97  0.76 - 1.27 mg/dL Final   • EGFR Result 08/23/2022 96  >59 mL/min/1.73 Final   • PTH, Intact 08/23/2022 15  15 - 65 pg/mL Final   • PTH, Intact 08/23/2022 Comment   Final    Interpretation                 Intact PTH    Calcium                                  (pg/mL)      (mg/dL)  Normal                          15 - 65     8.6 - 10.2  Primary Hyperparathyroidism         >65          >10.2  Secondary Hyperparathyroidism       >65          <10.2  Non-Parathyroid Hypercalcemia       <65          >10.2  Hypoparathyroidism                  <15          < 8.6  Non-Parathyroid Hypocalcemia    15 - 65          < 8.6   Admission on 08/10/2022, Discharged on 08/10/2022   Component Date Value Ref Range Status   • Stone Source 08/10/2022 Comment   Final    Left Ureter   • Color 08/10/2022 Tan   Final   • Size 08/10/2022 13x7  mm Final    Single piece received.   • Stone Weight 08/10/2022 326  mg Final   • Composition 08/10/2022 Comment   Final    Percentage (Represents the % composition)   • Ca Oxalate-Dihydrate, Stone 08/10/2022 20  % Final   • Ca Hydrogen Phos. 08/10/2022 80  % Final   • Comment 08/10/2022 Comment   Final    Calculus received in liquid. Wet calculi must be dried  before analysis, which delays reporting of results. Leaving  calculi in liquid (such as water, saline, blood, urine) may  lead to changes in composition.   • Photo 08/10/2022 Comment   Final    Photograph will follow under a separate cover   • Comments: 08/10/2022 Comment   Final    Physician questions regarding Calculi Analysis contact  [facility] at: [phone].   • Please note 08/10/2022 Comment   Final    Calculi report will follow via computer, mail or   delivery.   • Disclaimer: 08/10/2022 Comment   Final    This test  was developed and its performance characteristics  determined by [facility].  It has not been cleared or approved  by the Food and Drug Administration.   Pre-Admission Testing on 08/09/2022   Component Date Value Ref Range Status   • WBC 08/09/2022 4.58  3.40 - 10.80 10*3/mm3 Final   • RBC 08/09/2022 4.41  4.14 - 5.80 10*6/mm3 Final   • Hemoglobin 08/09/2022 14.5  13.0 - 17.7 g/dL Final   • Hematocrit 08/09/2022 42.6  37.5 - 51.0 % Final   • MCV 08/09/2022 96.6  79.0 - 97.0 fL Final   • MCH 08/09/2022 32.9  26.6 - 33.0 pg Final   • MCHC 08/09/2022 34.0  31.5 - 35.7 g/dL Final   • RDW 08/09/2022 12.9  12.3 - 15.4 % Final   • RDW-SD 08/09/2022 45.8  37.0 - 54.0 fl Final   • MPV 08/09/2022 9.6  6.0 - 12.0 fL Final   • Platelets 08/09/2022 167  140 - 450 10*3/mm3 Final   • Glucose 08/09/2022 104 (H)  65 - 99 mg/dL Final   • BUN 08/09/2022 10  6 - 20 mg/dL Final   • Creatinine 08/09/2022 0.93  0.76 - 1.27 mg/dL Final   • Sodium 08/09/2022 139  136 - 145 mmol/L Final   • Potassium 08/09/2022 3.9  3.5 - 5.2 mmol/L Final   • Chloride 08/09/2022 102  98 - 107 mmol/L Final   • CO2 08/09/2022 28.1  22.0 - 29.0 mmol/L Final   • Calcium 08/09/2022 9.2  8.6 - 10.5 mg/dL Final   • Total Protein 08/09/2022 6.6  6.0 - 8.5 g/dL Final   • Albumin 08/09/2022 4.19  3.50 - 5.20 g/dL Final   • ALT (SGPT) 08/09/2022 19  1 - 41 U/L Final   • AST (SGOT) 08/09/2022 17  1 - 40 U/L Final   • Alkaline Phosphatase 08/09/2022 70  39 - 117 U/L Final   • Total Bilirubin 08/09/2022 0.3  0.0 - 1.2 mg/dL Final   • Globulin 08/09/2022 2.4  gm/dL Final   • A/G Ratio 08/09/2022 1.7  g/dL Final   • BUN/Creatinine Ratio 08/09/2022 10.8  7.0 - 25.0 Final   • Anion Gap 08/09/2022 8.9  5.0 - 15.0 mmol/L Final   • eGFR 08/09/2022 100.7  >60.0 mL/min/1.73 Final    National Kidney Foundation and American Society of Nephrology (ASN) Task Force recommended calculation based on the Chronic Kidney Disease Epidemiology Collaboration (CKD-EPI) equation refit without  adjustment for race.        Procedure: None  Procedures     I have reviewed and agree with the above PMH, PSH, FMH, social history, medications, allergies, and labs.     Assessment/Plan:   Problem List Items Addressed This Visit        Genitourinary and Reproductive     Right ureteral stone - Primary       Health Maintenance:   Health Maintenance Due   Topic Date Due   • COLORECTAL CANCER SCREENING  Never done   • ANNUAL PHYSICAL  Never done   • TDAP/TD VACCINES (1 - Tdap) Never done   • COVID-19 Vaccine (2 - Pfizer series) 11/02/2021   • HEPATITIS C SCREENING  Never done        Smoking Counseling: Patient has never smoked    Urine Incontinence: Patient reports that he is not currently experiencing any symptoms of urinary incontinence.    Patient was given instructions and counseling regarding his condition or for health maintenance advice. Please see specific information pulled into the AVS if appropriate.    Patient Education:   Nephrolithiasis -patient is 2 weeks postop after right ureteroscopy for a left distal stone at the UVJ.  Patient reports that he is doing well but still experiencing some frequency/urgency.   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  I feel that this is contributing to stone formation and urinary symptoms. Advised patient to use bladder training daily.  Advised patient to continue Flomax to help with urination.  Advised patient to remain dehydrated and drink roughly 2 to 3 L of water per day.  Patient verbalizes understanding.  I will follow-up with the patient in 2 months for reevaluation of symptoms.  Advised patient to call if he has any questions or concerns.  Patient agrees to plan of care.    Visit Diagnoses:    ICD-10-CM ICD-9-CM   1. Right ureteral stone  N20.1 592.1       Meds Ordered During Visit:  No orders of the defined types were placed in this encounter.      Follow Up Appointment: 2  months  No follow-ups on file.      This document has been electronically signed by Carlos Sheriff PA-C   October 10, 2022 11:27 EDT    Part of this note may be an electronic transcription/translation of spoken language to printed text using the Dragon Dictation System.

## 2023-05-10 NOTE — ANESTHESIA POSTPROCEDURE EVALUATION
Patient: Dorita Conte    Procedure Summary     Date: 09/26/22 Room / Location: [facility] OR 06 /  [facility] OR    Anesthesia Start: 1012 Anesthesia Stop: 1037    Procedure: URETEROSCOPY LASER LITHOTRIPSY (Right ) Diagnosis:       Right ureteral stone      (Right ureteral stone [N20.1])    Surgeons: Eric Garcia MD Provider: Chepe Rdz MD    Anesthesia Type: general ASA Status: 2          Anesthesia Type: general    Vitals  Vitals Value Taken Time   /87 09/26/22 1048   Temp 97.2 °F (36.2 °C) 09/26/22 1038   Pulse 60 09/26/22 1048   Resp 9 09/26/22 1048   SpO2 94 % 09/26/22 1048           Post Anesthesia Care and Evaluation    Patient location during evaluation: bedside  Patient participation: complete - patient participated  Level of consciousness: awake and alert  Pain score: 1  Pain management: adequate    Airway patency: patent  Anesthetic complications: No anesthetic complications  PONV Status: none  Cardiovascular status: acceptable  Respiratory status: acceptable  Hydration status: acceptable       This office note has been dictated.

## 2024-10-11 ENCOUNTER — OFFICE VISIT (OUTPATIENT)
Dept: UROLOGY | Facility: CLINIC | Age: 49
End: 2024-10-11
Payer: COMMERCIAL

## 2024-10-11 ENCOUNTER — TELEPHONE (OUTPATIENT)
Dept: UROLOGY | Facility: CLINIC | Age: 49
End: 2024-10-11

## 2024-10-11 ENCOUNTER — HOSPITAL ENCOUNTER (OUTPATIENT)
Dept: GENERAL RADIOLOGY | Facility: HOSPITAL | Age: 49
Discharge: HOME | End: 2024-10-11
Payer: COMMERCIAL

## 2024-10-11 VITALS
HEART RATE: 69 BPM | DIASTOLIC BLOOD PRESSURE: 84 MMHG | WEIGHT: 212.4 LBS | SYSTOLIC BLOOD PRESSURE: 133 MMHG | HEIGHT: 69 IN | BODY MASS INDEX: 31.46 KG/M2

## 2024-10-11 DIAGNOSIS — N40.1 BENIGN PROSTATIC HYPERPLASIA WITH WEAK URINARY STREAM: Primary | ICD-10-CM

## 2024-10-11 DIAGNOSIS — R35.0 FREQUENCY OF MICTURITION: ICD-10-CM

## 2024-10-11 DIAGNOSIS — N20.0 BILATERAL KIDNEY STONES: ICD-10-CM

## 2024-10-11 DIAGNOSIS — R39.12 BENIGN PROSTATIC HYPERPLASIA WITH WEAK URINARY STREAM: Primary | ICD-10-CM

## 2024-10-11 PROCEDURE — 74018 RADEX ABDOMEN 1 VIEW: CPT

## 2024-10-11 PROCEDURE — 87086 URINE CULTURE/COLONY COUNT: CPT

## 2024-10-11 PROCEDURE — 80048 BASIC METABOLIC PNL TOTAL CA: CPT

## 2024-10-11 RX ORDER — FINASTERIDE 5 MG/1
5 TABLET, FILM COATED ORAL DAILY
Qty: 30 TABLET | Refills: 5 | Status: SHIPPED | OUTPATIENT
Start: 2024-10-11

## 2024-10-11 RX ORDER — MELOXICAM 15 MG/1
15 TABLET ORAL DAILY
COMMUNITY
Start: 2024-10-01 | End: 2025-10-01

## 2024-10-11 RX ORDER — LISINOPRIL 10 MG/1
1 TABLET ORAL DAILY
COMMUNITY
Start: 2024-10-01 | End: 2025-10-01

## 2024-10-11 RX ORDER — TAMSULOSIN HYDROCHLORIDE 0.4 MG/1
1 CAPSULE ORAL EVERY 12 HOURS
Qty: 90 CAPSULE | Refills: 7 | Status: SHIPPED | OUTPATIENT
Start: 2024-10-11

## 2024-10-11 NOTE — TELEPHONE ENCOUNTER
Called patient advised him x-ray showed a nonobstructing left 3 to 4 mm renal stone.  No obvious ureteral stones were identified.  Did recommend proceed forward with medications and I will call with other lab results once available on Monday.

## 2024-10-11 NOTE — PROGRESS NOTES
"Chief Complaint:    Chief Complaint   Patient presents with    Dysuria       Vital Signs:   /84 (BP Location: Right arm, Patient Position: Sitting, Cuff Size: Adult)   Pulse 69   Ht 175 cm (68.9\")   Wt 96.3 kg (212 lb 6.4 oz)   BMI 31.46 kg/m²   Body mass index is 31.46 kg/m².      HPI:  Dorita Conte is a 49 y.o. male who presents today for follow up    History of Present Illness  Mr. Conte presents to the clinic for a follow-up and concerns of dysuria/lower urinary tract symptoms.  He does have a past medical history significant for kidney stones was initially seen in 2022 secondary to distal straining of right ureteral calcifications.  He underwent a right uteroscopy with holmium laser lithotripsy at that time by Dr. Garcia.  He was able to remove each stone and the patient done well postop and has not been seen since.  He has been on Flomax in the past for lower urinary tract symptoms.  Patient reports that over the past 2 months he has an increase in dysuria, weak urinary stream, difficulty starting, and frequency/urgency.  He reports that he was taking Flomax and even increased it to once every 12 hours with overall improvement.  He states he ran out of medications and has not been taking them for a few weeks now.  He is concerned of possible acute urinary tract infection.  Patient was able to urinate after office visit and his urine will be sent off for culture.  Given history of kidney stones I did complete a KUB post office visit that showed a left intrarenal calcification.  No obvious ureteral stones were identified.  He did have a PSA taken by his primary care provider at the beginning of this year that came back good at 1.8.      Past Medical History:  Past Medical History:   Diagnosis Date    GERD (gastroesophageal reflux disease)     History of transfusion     [facility]    Kidney stones     Sleep apnea     no c-pap       Current Meds:  Current Outpatient Medications   Medication Sig " Dispense Refill    cetirizine (zyrTEC) 10 MG tablet Take 1 tablet by mouth Daily.      lisinopril (PRINIVIL,ZESTRIL) 10 MG tablet Take 1 tablet by mouth Daily.      meloxicam (MOBIC) 15 MG tablet Take 1 tablet by mouth Daily.      omeprazole (priLOSEC) 20 MG capsule Take 1 capsule by mouth Daily.      tamsulosin (FLOMAX) 0.4 MG capsule 24 hr capsule Take 1 capsule by mouth Every 12 (Twelve) Hours. 90 capsule 7    Testosterone Cypionate (DEPOTESTOTERONE CYPIONATE) 200 MG/ML injection Inject 1 mL into the appropriate muscle as directed by prescriber Every 14 (Fourteen) Days.      finasteride (PROSCAR) 5 MG tablet Take 1 tablet by mouth Daily. 30 tablet 5     No current facility-administered medications for this visit.        Allergies:   No Known Allergies     Past Surgical History:  Past Surgical History:   Procedure Laterality Date    BACK SURGERY      cervical instrumentation    EXTRACORPOREAL SHOCK WAVE LITHOTRIPSY (ESWL) Left 03/25/2022    Procedure: EXTRACORPOREAL SHOCKWAVE LITHOTRIPSY;  Surgeon: Eric Garcia MD;  Location: [facility];  Service: Urology;  Laterality: Left;    EYE SURGERY      Lasik    FRACTURE SURGERY      nasal    HERNIA REPAIR Left     inguinal    URETEROSCOPY LASER LITHOTRIPSY WITH STENT INSERTION Left 08/10/2022    Procedure: URETEROSCOPY, RETROGRADE PYELOGRAM;  Surgeon: Eric Garcia MD;  Location: [facility];  Service: Urology;  Laterality: Left;    URETEROSCOPY LASER LITHOTRIPSY WITH STENT INSERTION Right 9/26/2022    Procedure: URETEROSCOPY LASER LITHOTRIPSY;  Surgeon: Eric Garcia MD;  Location: [facility];  Service: Urology;  Laterality: Right;       Social History:  Social History     Socioeconomic History    Marital status: Single   Tobacco Use    Smoking status: Never    Smokeless tobacco: Never   Vaping Use    Vaping status: Never Used   Substance and Sexual Activity    Alcohol use: Yes     Comment: occasional    Drug use: Never    Sexual activity:  Defer       Family History:  No family history on file.    Review of Systems:  Review of Systems   Constitutional:  Negative for fatigue, fever and unexpected weight change.   Respiratory:  Negative for chest tightness and shortness of breath.    Cardiovascular:  Negative for chest pain.   Gastrointestinal:  Negative for abdominal pain, constipation, diarrhea, nausea and vomiting.   Genitourinary:  Positive for dysuria, frequency and urgency. Negative for difficulty urinating.   Skin:  Negative for rash.   Psychiatric/Behavioral:  Negative for confusion and suicidal ideas.        Physical Exam:  Physical Exam  Constitutional:       General: He is not in acute distress.     Appearance: Normal appearance.   HENT:      Head: Normocephalic and atraumatic.      Nose: Nose normal.      Mouth/Throat:      Mouth: Mucous membranes are moist.   Eyes:      Conjunctiva/sclera: Conjunctivae normal.   Cardiovascular:      Rate and Rhythm: Normal rate and regular rhythm.      Pulses: Normal pulses.      Heart sounds: Normal heart sounds.   Pulmonary:      Effort: Pulmonary effort is normal.      Breath sounds: Normal breath sounds.   Abdominal:      General: Bowel sounds are normal.      Palpations: Abdomen is soft.   Musculoskeletal:         General: Normal range of motion.      Cervical back: Normal range of motion.   Skin:     General: Skin is warm.   Neurological:      General: No focal deficit present.      Mental Status: He is alert and oriented to person, place, and time.   Psychiatric:         Mood and Affect: Mood normal.         Behavior: Behavior normal.         Thought Content: Thought content normal.         Judgment: Judgment normal.           Recent Image (CT and/or KUB):   CT Abdomen and Pelvis: No results found for this or any previous visit.     CT Stone Protocol: No results found for this or any previous visit.     KUB: Results for orders placed in visit on 09/12/22    XR abdomen kub    Narrative  EXAM:  XR  Abdomen, 1 View    EXAM DATE:  9/12/2022 12:03 PM    CLINICAL HISTORY:  Right flank pain/back pain; N20.0-Calculus of kidney    TECHNIQUE:  Frontal supine view of the abdomen/pelvis.    COMPARISON:  No relevant prior studies available.    FINDINGS:  Gastrointestinal tract:  Unremarkable.  No dilation.  Organs:  Calcification in the left kidney compatible with  nonobstructing stone.  Calcification in the right lower pelvis not seen  previously may represent distal ureter stone. Approximately 4 mm.  Correlate if patient has history of right flank pain.  No additional  stones identified.  Bones/joints:  Unremarkable.  Other findings:  Otherwise unremarkable exam.    Impression  1.  Calcification in the left kidney compatible with nonobstructing  stone.  2.  Calcification in the right lower pelvis not seen previously may  represent distal ureter stone. Approximately 4 mm. Correlate if patient  has history of right flank pain.    This report was finalized on 9/12/2022 12:59 PM by Dr. Yazan Kat MD.       Labs:  Brief Urine Lab Results       None          No visits with results within 3 Month(s) from this visit.   Latest known visit with results is:   Pre-Admission Testing on 09/23/2022   Component Date Value Ref Range Status    WBC 09/23/2022 5.09  3.40 - 10.80 10*3/mm3 Final    RBC 09/23/2022 4.53  4.14 - 5.80 10*6/mm3 Final    Hemoglobin 09/23/2022 14.8  13.0 - 17.7 g/dL Final    Hematocrit 09/23/2022 43.0  37.5 - 51.0 % Final    MCV 09/23/2022 94.9  79.0 - 97.0 fL Final    MCH 09/23/2022 32.7  26.6 - 33.0 pg Final    MCHC 09/23/2022 34.4  31.5 - 35.7 g/dL Final    RDW 09/23/2022 12.5  12.3 - 15.4 % Final    RDW-SD 09/23/2022 43.6  37.0 - 54.0 fl Final    MPV 09/23/2022 9.9  6.0 - 12.0 fL Final    Platelets 09/23/2022 170  140 - 450 10*3/mm3 Final    Glucose 09/23/2022 105 (H)  65 - 99 mg/dL Final    BUN 09/23/2022 12  6 - 20 mg/dL Final    Creatinine 09/23/2022 0.99  0.76 - 1.27 mg/dL Final    Sodium 09/23/2022  140  136 - 145 mmol/L Final    Potassium 09/23/2022 4.0  3.5 - 5.2 mmol/L Final    Chloride 09/23/2022 102  98 - 107 mmol/L Final    CO2 09/23/2022 26.4  22.0 - 29.0 mmol/L Final    Calcium 09/23/2022 9.5  8.6 - 10.5 mg/dL Final    BUN/Creatinine Ratio 09/23/2022 12.1  7.0 - 25.0 Final    Anion Gap 09/23/2022 11.6  5.0 - 15.0 mmol/L Final    eGFR 09/23/2022 93.4  >60.0 mL/min/1.73 Final    National Kidney Foundation and American Society of Nephrology (ASN) Task Force recommended calculation based on the Chronic Kidney Disease Epidemiology Collaboration (CKD-EPI) equation refit without adjustment for race.        Procedure: None  Procedures     I have reviewed and agree with the above PMH, PSH, FMH, social history, medications, allergies, and labs.     Assessment/Plan:   Problem List Items Addressed This Visit       Benign prostatic hyperplasia with lower urinary tract symptoms - Primary    Relevant Medications    tamsulosin (FLOMAX) 0.4 MG capsule 24 hr capsule    finasteride (PROSCAR) 5 MG tablet    Other Relevant Orders    Basic Metabolic Panel    Left ureteral stone    Overview     Added automatically from request for surgery [id number]          Other Visit Diagnoses       Frequency of micturition        Relevant Orders    Urine Culture - Urine, Urine, Random Void            Health Maintenance:   Health Maintenance Due   Topic Date Due    COLORECTAL CANCER SCREENING  Never done    TDAP/TD VACCINES (1 - Tdap) Never done    HEPATITIS C SCREENING  Never done    ANNUAL PHYSICAL  Never done    ZOSTER VACCINE (1 of 2) Never done    BMI FOLLOWUP  08/09/2023    COVID-19 Vaccine (4 - 2023-24 season) 09/01/2024        Smoking Counseling: Never smoked.  Never used smokeless tobacco.      Urine Incontinence: Patient reports that he is not currently experiencing any symptoms of urinary incontinence.    Patient was given instructions and counseling regarding his condition or for health maintenance advice. Please see specific  information pulled into the AVS if appropriate.    Patient Education:   Benign Prostate Hypertrophy (BPH): Discussed the pathophysiology of BPH and obstruction.  We discussed the static and dynamic effects of BPH as well as using 5 alpha reductase inhibitors versus alpha blockade.  We discussed the indications for transurethral surgery as well and/ or other therapeutic options available including all of the newer techniques.  Patient has been on Flomax with minimal improvement.  Will increase his Flomax to 0.4 mg once every 12 hours.  Discussed the use of dual therapy with 5 alpha reductase inhibitor such as finasteride.  Does wish to start this medication as well.  I will send a prescription of both medications to his local pharmacy.  Discussed the risk and benefits of both of these in detail with the patient.  Educated to discontinue Flomax if he begins experiencing increasing lightheadedness, dizziness, blurry vision, chest pain, shortness of breath, or syncope.  He verbalized understanding.  Renal calculus - It was discussed with the patient the presence of a left nonobstructing intrarenal stone. We discussed the various therapeutic options available including percutaneous nephrostolithotomy, ureteroscopy and extracorporeal shockwave  lithotripsy.  We discussed the risks of lithotripsy including the passage of stones leading to a 3% chance of Steinstrasse or a large string of stones in the distal ureter. In this incidence the patient was informed that a ureteroscopy is indicated for obstructing fragments.  Patient was informed of an extremely rare incidence of renal hematoma and the significance of this.  Patient was educated on percutaneous nephrostolithotomy and its use as well as the risks and benefits such as the need for postoperative hospitalization, and the risk of damage to the kidney and the remote risk of a nephrectomy.  We also discussed the use of ureteroscopy in the upper tracts and its decreased  success rate to completely remove the stones likely causing stent placement leading to an additional procedure for removal.  We discussed the absolute relative indicators for intervention including the presence of sepsis and uncontrollable pain leading to need for urgent intervention.  We discussed placement of a stent if indicated and the management of the stent as well.  Given patient does not have any significant back or flank pain at this time we will hold off on any surgical interventions.  Did discuss the use of a left extracorporal shockwave lithotripsy.  Discussed the risk and benefits of this in detail with the patient.  Otherwise we will have the patient follow-up in 2 months for reevaluation    Visit Diagnoses:    ICD-10-CM ICD-9-CM   1. Benign prostatic hyperplasia with weak urinary stream  N40.1 600.01    R39.12 788.62   2. Bilateral kidney stones  N20.0 592.0   3. Frequency of micturition  R35.0 788.41     A total of 30 minutes were spent coordinating this patient’s care in clinic today; 20 minutes of which were face-to-face with the patient, reviewing medical history and counseling on the current treatment and followup plan.  All questions were answered to patient's satisfaction.    Meds Ordered During Visit:  New Medications Ordered This Visit   Medications    tamsulosin (FLOMAX) 0.4 MG capsule 24 hr capsule     Sig: Take 1 capsule by mouth Every 12 (Twelve) Hours.     Dispense:  90 capsule     Refill:  7    finasteride (PROSCAR) 5 MG tablet     Sig: Take 1 tablet by mouth Daily.     Dispense:  30 tablet     Refill:  5       Follow Up Appointment: 3 months  No follow-ups on file.      This document has been electronically signed by Carlos Sheriff PA-C   October 11, 2024 16:12 EDT    Part of this note may be an electronic transcription/translation of spoken language to printed text using the Dragon Dictation System.

## 2024-10-12 LAB
ANION GAP SERPL CALCULATED.3IONS-SCNC: 10 MMOL/L (ref 5–15)
BACTERIA SPEC AEROBE CULT: NO GROWTH
BUN SERPL-MCNC: 16 MG/DL (ref 6–20)
BUN/CREAT SERPL: 13.3 (ref 7–25)
CALCIUM SPEC-SCNC: 9.2 MG/DL (ref 8.6–10.5)
CHLORIDE SERPL-SCNC: 102 MMOL/L (ref 98–107)
CO2 SERPL-SCNC: 24 MMOL/L (ref 22–29)
CREAT SERPL-MCNC: 1.2 MG/DL (ref 0.76–1.27)
EGFRCR SERPLBLD CKD-EPI 2021: 73.2 ML/MIN/1.73
GLUCOSE SERPL-MCNC: 90 MG/DL (ref 65–99)
POTASSIUM SERPL-SCNC: 4.5 MMOL/L (ref 3.5–5.2)
SODIUM SERPL-SCNC: 136 MMOL/L (ref 136–145)

## 2024-10-14 ENCOUNTER — TELEPHONE (OUTPATIENT)
Dept: UROLOGY | Facility: CLINIC | Age: 49
End: 2024-10-14
Payer: COMMERCIAL

## 2024-10-14 NOTE — TELEPHONE ENCOUNTER
Attempted to call patient about urine culture and BMP however he did not answer.  Left voicemail advising labs were good and recommend to call back with questions or concerns.  Urine culture showed no growth and BMP showed stable kidney function.

## 2025-02-14 ENCOUNTER — TELEPHONE (OUTPATIENT)
Dept: UROLOGY | Facility: CLINIC | Age: 50
End: 2025-02-14

## 2025-02-14 NOTE — TELEPHONE ENCOUNTER
Caller: LAURA SCHULZ    Relationship to patient: SELF    Best call back number: [phone]    Chief complaint: PATIENT CALLED TO RESCHEDULE AN APPOINTMENT FROM DECEMBER. HE IS HAVING FREQUENCY AND SCROTAL PAIN, WHICH HAS HAPPENED BEFORE BUT GETTING WORSE. FIRST APPT WITH ZULEYKA IS APRIL 2, I DID ADD PATIENT TO THE WAITING LIST. IF SOMETHING OPENS UP OR IF POSSIBLE, HE WOULD LIKE TO BE SEEN SOONER   Type of visit: FU

## 2025-04-02 ENCOUNTER — OFFICE VISIT (OUTPATIENT)
Dept: UROLOGY | Facility: CLINIC | Age: 50
End: 2025-04-02
Payer: COMMERCIAL

## 2025-04-02 VITALS
DIASTOLIC BLOOD PRESSURE: 81 MMHG | HEART RATE: 74 BPM | WEIGHT: 224.6 LBS | HEIGHT: 69 IN | BODY MASS INDEX: 33.27 KG/M2 | SYSTOLIC BLOOD PRESSURE: 134 MMHG

## 2025-04-02 DIAGNOSIS — N40.1 BENIGN PROSTATIC HYPERPLASIA WITH WEAK URINARY STREAM: ICD-10-CM

## 2025-04-02 DIAGNOSIS — R35.0 FREQUENCY OF MICTURITION: ICD-10-CM

## 2025-04-02 DIAGNOSIS — N52.8 OTHER MALE ERECTILE DYSFUNCTION: ICD-10-CM

## 2025-04-02 DIAGNOSIS — R39.12 BENIGN PROSTATIC HYPERPLASIA WITH WEAK URINARY STREAM: ICD-10-CM

## 2025-04-02 DIAGNOSIS — N41.0 ACUTE PROSTATITIS: Primary | ICD-10-CM

## 2025-04-02 LAB
BILIRUB BLD-MCNC: NEGATIVE MG/DL
CLARITY, POC: CLEAR
COLOR UR: ABNORMAL
EXPIRATION DATE: ABNORMAL
GLUCOSE UR STRIP-MCNC: NEGATIVE MG/DL
KETONES UR QL: ABNORMAL
LEUKOCYTE EST, POC: NEGATIVE
Lab: ABNORMAL
NITRITE UR-MCNC: NEGATIVE MG/ML
PH UR: 6 [PH] (ref 5–8)
PROT UR STRIP-MCNC: ABNORMAL MG/DL
RBC # UR STRIP: NEGATIVE /UL
SP GR UR: 1.02 (ref 1–1.03)
UROBILINOGEN UR QL: NORMAL

## 2025-04-02 RX ORDER — SULFAMETHOXAZOLE AND TRIMETHOPRIM 800; 160 MG/1; MG/1
1 TABLET ORAL 2 TIMES DAILY
Qty: 28 TABLET | Refills: 1 | Status: SHIPPED | OUTPATIENT
Start: 2025-04-02

## 2025-04-02 RX ORDER — TADALAFIL 5 MG/1
5 TABLET ORAL DAILY
Qty: 30 TABLET | Refills: 5 | Status: SHIPPED | OUTPATIENT
Start: 2025-04-02

## 2025-04-02 RX ORDER — FINASTERIDE 5 MG/1
5 TABLET, FILM COATED ORAL DAILY
Qty: 90 TABLET | Refills: 3 | Status: SHIPPED | OUTPATIENT
Start: 2025-04-02

## 2025-04-02 NOTE — PROGRESS NOTES
"Chief Complaint:    Chief Complaint   Patient presents with    Benign Prostatic Hypertrophy       Vital Signs:   /81   Pulse 74   Ht 175 cm (68.9\")   Wt 102 kg (224 lb 9.6 oz)   BMI 33.27 kg/m²   Body mass index is 33.27 kg/m².      HPI:  Dorita Conte is a 49 y.o. male who presents today for follow up    History of Present Illness  Mr. Conte presents to the clinic for a follow-up and concerns of dysuria/lower urinary tract symptoms.  He does have a past medical history significant for kidney stones was initially seen in 2022 secondary to distal right ureteral calculus..  He underwent a right uteroscopy with holmium laser lithotripsy at that time by Dr. Garcia.  He was able to remove each stone and the patient done well postop and was advised to follow-up as needed.  He has been on Flomax in the past for lower urinary tract symptoms.  He was last seen in office in October 2024 and his Flomax was increased to once every 12 hours and was started on finasteride daily.  He does have a history of hypogonadism and is currently on testosterone placement therapy.  He had recent blood work completed in January 2025 which showed a normal PSA of 2.2.  His PSA prior to this in January 2024 was 1.8.  Did have a testosterone taken that was 812.  He still endorses decreased sex drive, difficulty maintaining erections, dysuria, frequency, urgency, and difficulty with urination.      Past Medical History:  Past Medical History:   Diagnosis Date    GERD (gastroesophageal reflux disease)     History of transfusion     [facility]    Kidney stones     Sleep apnea     no c-pap       Current Meds:  Current Outpatient Medications   Medication Sig Dispense Refill    cetirizine (zyrTEC) 10 MG tablet Take 1 tablet by mouth Daily.      finasteride (PROSCAR) 5 MG tablet Take 1 tablet by mouth Daily. 90 tablet 3    lisinopril (PRINIVIL,ZESTRIL) 10 MG tablet Take 1 tablet by mouth Daily.      meloxicam (MOBIC) 15 MG tablet Take 1 " tablet by mouth Daily.      omeprazole (priLOSEC) 20 MG capsule Take 1 capsule by mouth Daily.      tamsulosin (FLOMAX) 0.4 MG capsule 24 hr capsule Take 1 capsule by mouth Every 12 (Twelve) Hours. 90 capsule 7    Testosterone Cypionate (DEPOTESTOTERONE CYPIONATE) 200 MG/ML injection Inject 1 mL into the appropriate muscle as directed by prescriber Every 14 (Fourteen) Days.      sulfamethoxazole-trimethoprim (Bactrim DS) 800-160 MG per tablet Take 1 tablet by mouth 2 (Two) Times a Day. 28 tablet 1    tadalafil (CIALIS) 5 MG tablet Take 1 tablet by mouth Daily. 30 tablet 5     No current facility-administered medications for this visit.        Allergies:   No Known Allergies     Past Surgical History:  Past Surgical History:   Procedure Laterality Date    BACK SURGERY      cervical instrumentation    EXTRACORPOREAL SHOCK WAVE LITHOTRIPSY (ESWL) Left 03/25/2022    Procedure: EXTRACORPOREAL SHOCKWAVE LITHOTRIPSY;  Surgeon: Eric Garcia MD;  Location: [facility];  Service: Urology;  Laterality: Left;    EYE SURGERY      Lasik    FRACTURE SURGERY      nasal    HERNIA REPAIR Left     inguinal    URETEROSCOPY LASER LITHOTRIPSY WITH STENT INSERTION Left 08/10/2022    Procedure: URETEROSCOPY, RETROGRADE PYELOGRAM;  Surgeon: Eric Garcia MD;  Location: [facility];  Service: Urology;  Laterality: Left;    URETEROSCOPY LASER LITHOTRIPSY WITH STENT INSERTION Right 9/26/2022    Procedure: URETEROSCOPY LASER LITHOTRIPSY;  Surgeon: Eric Garcia MD;  Location: [facility];  Service: Urology;  Laterality: Right;       Social History:  Social History     Socioeconomic History    Marital status: Single   Tobacco Use    Smoking status: Never    Smokeless tobacco: Never   Vaping Use    Vaping status: Never Used   Substance and Sexual Activity    Alcohol use: Yes     Comment: occasional    Drug use: Never    Sexual activity: Defer       Family History:  History reviewed. No pertinent family history.    Review  of Systems:  Review of Systems   Constitutional:  Negative for fatigue, fever and unexpected weight change.   Respiratory:  Negative for chest tightness and shortness of breath.    Cardiovascular:  Negative for chest pain.   Gastrointestinal:  Negative for abdominal pain, constipation, diarrhea, nausea and vomiting.   Genitourinary:  Positive for difficulty urinating, dysuria, frequency and urgency.   Skin:  Negative for rash.   Psychiatric/Behavioral:  Negative for confusion and suicidal ideas.        Physical Exam:  Physical Exam  Constitutional:       General: He is not in acute distress.     Appearance: Normal appearance.   HENT:      Head: Normocephalic and atraumatic.      Nose: Nose normal.      Mouth/Throat:      Mouth: Mucous membranes are moist.   Eyes:      Conjunctiva/sclera: Conjunctivae normal.   Cardiovascular:      Rate and Rhythm: Normal rate.      Pulses: Normal pulses.   Pulmonary:      Effort: Pulmonary effort is normal.   Abdominal:      Palpations: Abdomen is soft.   Genitourinary:     Comments: Smooth firm enlarged prostate with bogginess and tenderness to palpation  Musculoskeletal:         General: Normal range of motion.      Cervical back: Normal range of motion.   Skin:     General: Skin is warm.   Neurological:      General: No focal deficit present.      Mental Status: He is alert and oriented to person, place, and time.   Psychiatric:         Mood and Affect: Mood normal.         Behavior: Behavior normal.         Thought Content: Thought content normal.         Judgment: Judgment normal.           Recent Image (CT and/or KUB):   CT Abdomen and Pelvis: No results found for this or any previous visit.     CT Stone Protocol: No results found for this or any previous visit.     KUB: Results for orders placed in visit on 09/12/22    XR abdomen kub    Narrative  EXAM:  XR Abdomen, 1 View    EXAM DATE:  9/12/2022 12:03 PM    CLINICAL HISTORY:  Right flank pain/back pain; N20.0-Calculus of  kidney    TECHNIQUE:  Frontal supine view of the abdomen/pelvis.    COMPARISON:  No relevant prior studies available.    FINDINGS:  Gastrointestinal tract:  Unremarkable.  No dilation.  Organs:  Calcification in the left kidney compatible with  nonobstructing stone.  Calcification in the right lower pelvis not seen  previously may represent distal ureter stone. Approximately 4 mm.  Correlate if patient has history of right flank pain.  No additional  stones identified.  Bones/joints:  Unremarkable.  Other findings:  Otherwise unremarkable exam.    Impression  1.  Calcification in the left kidney compatible with nonobstructing  stone.  2.  Calcification in the right lower pelvis not seen previously may  represent distal ureter stone. Approximately 4 mm. Correlate if patient  has history of right flank pain.    This report was finalized on 9/12/2022 12:59 PM by Dr. Yazan aKt MD.       Labs:  Brief Urine Lab Results  (Last result in the past 365 days)        Color   Clarity   Blood   Leuk Est   Nitrite   Protein   CREAT   Urine HCG        04/02/25 1502 Brenda   Clear   Negative   Negative   Negative   Trace                 Office Visit on 04/02/2025   Component Date Value Ref Range Status    Color 04/02/2025 Brenda  Yellow, Straw, Dark Yellow, Brenda Final    Clarity, UA 04/02/2025 Clear  Clear Final    Specific Gravity  04/02/2025 1.025  1.005 - 1.030 Final    pH, Urine 04/02/2025 6.0  5.0 - 8.0 Final    Leukocytes 04/02/2025 Negative  Negative Final    Nitrite, UA 04/02/2025 Negative  Negative Final    Protein, POC 04/02/2025 Trace (A)  Negative mg/dL Final    Glucose, UA 04/02/2025 Negative  Negative mg/dL Final    Ketones, UA 04/02/2025 Trace (A)  Negative Final    Urobilinogen, UA 04/02/2025 Normal  Normal, 0.2 E.U./dL Final    Bilirubin 04/02/2025 Negative  Negative Final    Blood, UA 04/02/2025 Negative  Negative Final    Lot Number 04/02/2025 [id number]   Final    Expiration Date 04/02/2025 5/1/2026    Final        Procedure: None  Procedures     I have reviewed and agree with the above PMH, PSH, FMH, social history, medications, allergies, and labs.     Assessment/Plan:   Problem List Items Addressed This Visit       Benign prostatic hyperplasia with lower urinary tract symptoms    Relevant Medications    finasteride (PROSCAR) 5 MG tablet    tadalafil (CIALIS) 5 MG tablet    Other male erectile dysfunction    Relevant Medications    tadalafil (CIALIS) 5 MG tablet     Other Visit Diagnoses         Acute prostatitis    -  Primary    Relevant Medications    sulfamethoxazole-trimethoprim (Bactrim DS) 800-160 MG per tablet    tadalafil (CIALIS) 5 MG tablet      Frequency of micturition        Relevant Orders    POC Urinalysis Dipstick, Automated (Completed)              Health Maintenance:   Health Maintenance Due   Topic Date Due    COLORECTAL CANCER SCREENING  Never done    HEPATITIS C SCREENING  Never done    ANNUAL PHYSICAL  Never done    Pneumococcal Vaccine 50+ (1 of 1 - PCV) Never done    COVID-19 Vaccine (4 - 2024-25 season) 09/01/2024    ZOSTER VACCINE (2 of 2) 03/14/2025        Smoking Counseling: Never smoked.  Never used smokeless tobacco.      Urine Incontinence: Patient reports that he is not currently experiencing any symptoms of urinary incontinence.    Patient was given instructions and counseling regarding his condition or for health maintenance advice. Please see specific information pulled into the AVS if appropriate.    Patient Education:   Benign Prostate Hypertrophy (BPH): Discussed the pathophysiology of BPH and obstruction.  We discussed the static and dynamic effects of BPH as well as using 5 alpha reductase inhibitors versus alpha blockade.  We discussed the indications for transurethral surgery as well and/ or other therapeutic options available including all of the newer techniques.  Recent PSA was 2.2.  Recommend to continue with Flomax once daily, finasteride once daily, and will add on  tadalafil 5 mg daily given current erectile dysfunction.  Did discuss cystoscopy for lower tract investigation patient wished to proceed forward with this.  Will get him scheduled for April 23.  Prostatitis -I discussed the pathophysiology of prostatitis.  Discussed the difference between acute versus chronic prostatitis.  I discussed treatment methods which can include conservative versus antimicrobial medications.  Discussed with the patient the use of antibiotics such as sulfa versus fluoroquinolones.  I discussed the risk and benefits of both of these medications.  Advised patient the risks of tendinitis/tendon rupture with use of fluoroquinolones.  Also discussed the use of warm soaks/compresses twice daily for 10 to 15 minutes.  Given patient's symptoms we will start him on Bactrim twice daily for at least 2 weeks.  Advised him to finish prescription prior to cystoscopy.  He verbalized understanding.  Erectile dysfunction -discussed with the patient the pathophysiology of erectile dysfunction.  I explained to the patient that erectile dysfunction is a symptom and no disorder.  I educated the patient that erectile dysfunction is often secondary to significant arterial insufficiency, diabetes mellitus, medications, trauma, low testosterone, or psychological factors.  I discussed with the patient ways to help treat erectile dysfunction which include but are not limited to medications, mechanical devices, penile injections, and penile implants.  Given concurrent ED and lower urinary tract symptoms we will start him on tadalafil 5 mg once daily.  Otherwise we will see him back in a few weeks for cystoscopy.    Visit Diagnoses:    ICD-10-CM ICD-9-CM   1. Acute prostatitis  N41.0 601.0   2. Frequency of micturition  R35.0 788.41   3. Benign prostatic hyperplasia with weak urinary stream  N40.1 600.01    R39.12 788.62   4. Other male erectile dysfunction  N52.8 607.84       A total of 30 minutes were spent  coordinating this patient’s care in clinic today; 20 minutes of which were face-to-face with the patient, reviewing medical history and counseling on the current treatment and followup plan.  All questions were answered to patient's satisfaction.    Meds Ordered During Visit:  New Medications Ordered This Visit   Medications    sulfamethoxazole-trimethoprim (Bactrim DS) 800-160 MG per tablet     Sig: Take 1 tablet by mouth 2 (Two) Times a Day.     Dispense:  28 tablet     Refill:  1    finasteride (PROSCAR) 5 MG tablet     Sig: Take 1 tablet by mouth Daily.     Dispense:  90 tablet     Refill:  3    tadalafil (CIALIS) 5 MG tablet     Sig: Take 1 tablet by mouth Daily.     Dispense:  30 tablet     Refill:  5       Follow Up Appointment: Cystoscopy April 23 No follow-ups on file.      This document has been electronically signed by Carlos Sheriff PA-C   April 2, 2025 15:58 EDT    Part of this note may be an electronic transcription/translation of spoken language to printed text using the Dragon Dictation System.

## 2025-04-09 ENCOUNTER — TELEPHONE (OUTPATIENT)
Dept: UROLOGY | Facility: CLINIC | Age: 50
End: 2025-04-09
Payer: COMMERCIAL

## 2025-04-09 NOTE — TELEPHONE ENCOUNTER
PA for Tadalafil has been sent to pt's insurance company and APPROVED!!              Approved today by [employer] 2017  CaseId:[id number];Status:Approved;Review Type:Prior Auth;Coverage Start Date:03/10/2025;Coverage End Date:04/09/2026;  Effective Date: 3/10/2025  Authorization Expiration Date: 4/9/2026  Drug  Tadalafil 5MG tablets  ePA cloud logo  Form  [employer] Electronic PA Form (2017 NCPDP)  Original Claim Info  75 CALL HELP DESK

## 2025-04-23 ENCOUNTER — PROCEDURE VISIT (OUTPATIENT)
Dept: UROLOGY | Facility: CLINIC | Age: 50
End: 2025-04-23
Payer: COMMERCIAL

## 2025-04-23 DIAGNOSIS — N35.814 OTHER STRICTURE OF ANTERIOR URETHRA IN MALE: ICD-10-CM

## 2025-04-23 DIAGNOSIS — Z48.816 AFTERCARE FOLLOWING SURGERY OF THE GENITOURINARY SYSTEM: Primary | ICD-10-CM

## 2025-04-23 DIAGNOSIS — N40.1 BENIGN PROSTATIC HYPERPLASIA WITH WEAK URINARY STREAM: ICD-10-CM

## 2025-04-23 DIAGNOSIS — R39.12 BENIGN PROSTATIC HYPERPLASIA WITH WEAK URINARY STREAM: ICD-10-CM

## 2025-04-23 RX ORDER — GENTAMICIN 40 MG/ML
80 INJECTION, SOLUTION INTRAMUSCULAR; INTRAVENOUS ONCE
Status: COMPLETED | OUTPATIENT
Start: 2025-04-23 | End: 2025-04-23

## 2025-04-23 RX ADMIN — GENTAMICIN 80 MG: 40 INJECTION, SOLUTION INTRAMUSCULAR; INTRAVENOUS at 14:21

## 2025-04-23 NOTE — PROGRESS NOTES
Chief Complaint:    Chief Complaint   Patient presents with    Cystoscopy      Bph       Vital Signs:   There were no vitals taken for this visit.  There is no height or weight on file to calculate BMI.      HPI:  Dorita Conte is a 49 y.o. male who presents today for follow up    History of Present Illness  Mr. Conte presents to the clinic for evaluation of BPH with lower urinary tract symptoms.  He is followed along with us and is currently on Flomax twice daily as well as finasteride once daily.  Last office visit he was transition to tadalafil 5 mg daily and Flomax once daily.  He continues with finasteride.  He was given Bactrim for possible prostatitis given symptoms.  I did recommend a cystoscopy for lower tract investigation to rule out any abnormalities of the prostate or urethra.  He does wish to proceed forward with this in office today.      Past Medical History:  Past Medical History:   Diagnosis Date    GERD (gastroesophageal reflux disease)     History of transfusion     [facility]    Kidney stones     Sleep apnea     no c-pap       Current Meds:  Current Outpatient Medications   Medication Sig Dispense Refill    cetirizine (zyrTEC) 10 MG tablet Take 1 tablet by mouth Daily.      finasteride (PROSCAR) 5 MG tablet Take 1 tablet by mouth Daily. 90 tablet 3    lisinopril (PRINIVIL,ZESTRIL) 10 MG tablet Take 1 tablet by mouth Daily.      meloxicam (MOBIC) 15 MG tablet Take 1 tablet by mouth Daily.      omeprazole (priLOSEC) 20 MG capsule Take 1 capsule by mouth Daily.      sulfamethoxazole-trimethoprim (Bactrim DS) 800-160 MG per tablet Take 1 tablet by mouth 2 (Two) Times a Day. 28 tablet 1    tadalafil (CIALIS) 5 MG tablet Take 1 tablet by mouth Daily. 30 tablet 5    tamsulosin (FLOMAX) 0.4 MG capsule 24 hr capsule Take 1 capsule by mouth Every 12 (Twelve) Hours. 90 capsule 7    Testosterone Cypionate (DEPOTESTOTERONE CYPIONATE) 200 MG/ML injection Inject 1 mL into the appropriate muscle as directed  by prescriber Every 14 (Fourteen) Days.       No current facility-administered medications for this visit.        Allergies:   No Known Allergies     Past Surgical History:  Past Surgical History:   Procedure Laterality Date    BACK SURGERY      cervical instrumentation    EXTRACORPOREAL SHOCK WAVE LITHOTRIPSY (ESWL) Left 03/25/2022    Procedure: EXTRACORPOREAL SHOCKWAVE LITHOTRIPSY;  Surgeon: Eric Garcia MD;  Location: [facility];  Service: Urology;  Laterality: Left;    EYE SURGERY      Lasik    FRACTURE SURGERY      nasal    HERNIA REPAIR Left     inguinal    URETEROSCOPY LASER LITHOTRIPSY WITH STENT INSERTION Left 08/10/2022    Procedure: URETEROSCOPY, RETROGRADE PYELOGRAM;  Surgeon: Eric Garcia MD;  Location: [facility];  Service: Urology;  Laterality: Left;    URETEROSCOPY LASER LITHOTRIPSY WITH STENT INSERTION Right 9/26/2022    Procedure: URETEROSCOPY LASER LITHOTRIPSY;  Surgeon: Eric Garcia MD;  Location: [facility];  Service: Urology;  Laterality: Right;       Social History:  Social History     Socioeconomic History    Marital status: Single   Tobacco Use    Smoking status: Never    Smokeless tobacco: Never   Vaping Use    Vaping status: Never Used   Substance and Sexual Activity    Alcohol use: Yes     Comment: occasional    Drug use: Never    Sexual activity: Defer       Family History:  History reviewed. No pertinent family history.    Review of Systems:  Review of Systems   Constitutional:  Negative for fatigue, fever and unexpected weight change.   Respiratory:  Negative for chest tightness and shortness of breath.    Cardiovascular:  Negative for chest pain.   Gastrointestinal:  Negative for abdominal pain, constipation, diarrhea, nausea and vomiting.   Genitourinary:  Positive for difficulty urinating, frequency and urgency. Negative for dysuria.   Skin:  Negative for rash.   Psychiatric/Behavioral:  Negative for confusion and suicidal ideas.        Physical  Exam:  Physical Exam  Constitutional:       General: He is not in acute distress.     Appearance: Normal appearance.   HENT:      Head: Normocephalic and atraumatic.      Nose: Nose normal.      Mouth/Throat:      Mouth: Mucous membranes are moist.   Eyes:      Conjunctiva/sclera: Conjunctivae normal.   Cardiovascular:      Rate and Rhythm: Normal rate.      Pulses: Normal pulses.   Pulmonary:      Effort: Pulmonary effort is normal.   Abdominal:      Palpations: Abdomen is soft.   Genitourinary:     Penis: Normal.       Testes: Normal.   Musculoskeletal:         General: Normal range of motion.      Cervical back: Normal range of motion.   Skin:     General: Skin is warm.   Neurological:      General: No focal deficit present.      Mental Status: He is alert and oriented to person, place, and time.   Psychiatric:         Mood and Affect: Mood normal.         Behavior: Behavior normal.         Thought Content: Thought content normal.         Judgment: Judgment normal.             Recent Image (CT and/or KUB):   CT Abdomen and Pelvis: No results found for this or any previous visit.     CT Stone Protocol: No results found for this or any previous visit.     KUB: Results for orders placed in visit on 10/11/24    XR abdomen kub    Narrative  EXAMINATION: XR ABDOMEN KUB-    CLINICAL INDICATION: dysuria/flank pain; N20.0-Calculus of kidney      COMPARISON: 9/12/2022    FINDINGS:  Moderate to large stool burden    Calcification over the left kidney.      This report was finalized on 10/11/2024 2:52 PM by Dr. Chinmay Lipscomb MD.       Labs:  Brief Urine Lab Results  (Last result in the past 365 days)        Color   Clarity   Blood   Leuk Est   Nitrite   Protein   CREAT   Urine HCG        04/02/25 1502 Brenda   Clear   Negative   Negative   Negative   Trace                 Office Visit on 04/02/2025   Component Date Value Ref Range Status    Color 04/02/2025 Brenda  Yellow, Straw, Dark Yellow, Brenda Final    Clarity, UA  04/02/2025 Clear  Clear Final    Specific Gravity  04/02/2025 1.025  1.005 - 1.030 Final    pH, Urine 04/02/2025 6.0  5.0 - 8.0 Final    Leukocytes 04/02/2025 Negative  Negative Final    Nitrite, UA 04/02/2025 Negative  Negative Final    Protein, POC 04/02/2025 Trace (A)  Negative mg/dL Final    Glucose, UA 04/02/2025 Negative  Negative mg/dL Final    Ketones, UA 04/02/2025 Trace (A)  Negative Final    Urobilinogen, UA 04/02/2025 Normal  Normal, 0.2 E.U./dL Final    Bilirubin 04/02/2025 Negative  Negative Final    Blood, UA 04/02/2025 Negative  Negative Final    Lot Number 04/02/2025 [id number]   Final    Expiration Date 04/02/2025 5/1/2026   Final        Procedure:Patient presents today for cystourethroscopy.  I went ahead and obtained an informed consent including the risks of anesthesia, bleeding, infection, etc.  After prepping and draping in a sterile fashion in the low dorsal lithotomy position, the urethra was gently anesthetized with 10 mL of 2% viscous Xylocaine jelly.  After an appropriate period of topical anesthesia, I used the Olympus digital 14 [place] flexible cystoscope to examine the anterior urethra which had a slight narrowing however was able to pass the flexible cystoscopy scope past this.  His prostate had mild median bar enlargement.  The ureteral orifices were visualized and normal in position and configuration. There were no stones, tumors, or foreign bodies.  The blue light was enabled and was negative allowing us to see small mucosal lesions. The patient was given 80 mg of gentamicin in an intramuscular fashion as prophylaxis for the cystoscopy and released from the clinic.   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  Never used smokeless tobacco.    Urine Incontinence: Patient reports that he is not currently experiencing any symptoms of urinary incontinence.    Patient was given instructions and counseling regarding his condition or for health maintenance advice. Please see specific information pulled into the AVS if appropriate.    Patient Education:   BPH with lower urinary tract symptoms -patient is currently on triple therapy with Flomax, tadalafil, and finasteride all once daily.  Cystoscopy revealed slight urethral narrowing with no obvious stricture however patient could benefit from dilation.  Did discuss the nature of this procedure in detail in the OR setting under general anesthesia.  Did discuss the use of a transurethral resection of the prostate however patient wishes to go hold off on surgical intervention at this time given concerns of worsening erectile dysfunction and retrograde ejaculation.  Will schedule him back in 1 month for reevaluation    Visit Diagnoses:    ICD-10-CM ICD-9-CM   1. Aftercare following surgery of the genitourinary system  Z48.816 V58.76   2. Benign prostatic hyperplasia with weak urinary stream  N40.1 600.01    R39.12 788.62   3. 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      This document has been electronically signed by Carlos Sheriff PA-C   April 23, 2025  18:33 EDT    Part of this note may be an electronic transcription/translation of spoken language to printed text using the Dragon Dictation System.

## 2025-06-04 ENCOUNTER — OFFICE VISIT (OUTPATIENT)
Dept: UROLOGY | Facility: CLINIC | Age: 50
End: 2025-06-04
Payer: COMMERCIAL

## 2025-06-04 VITALS
SYSTOLIC BLOOD PRESSURE: 136 MMHG | HEIGHT: 69 IN | HEART RATE: 77 BPM | BODY MASS INDEX: 32.23 KG/M2 | WEIGHT: 217.6 LBS | DIASTOLIC BLOOD PRESSURE: 75 MMHG

## 2025-06-04 DIAGNOSIS — N35.819 OTHER STRICTURE OF URETHRA IN MALE: Primary | ICD-10-CM

## 2025-06-04 DIAGNOSIS — N40.1 BENIGN PROSTATIC HYPERPLASIA WITH WEAK URINARY STREAM: ICD-10-CM

## 2025-06-04 DIAGNOSIS — R39.12 BENIGN PROSTATIC HYPERPLASIA WITH WEAK URINARY STREAM: ICD-10-CM

## 2025-06-04 PROBLEM — N35.919 URETHRAL STRICTURE: Status: ACTIVE | Noted: 2025-06-04

## 2025-06-04 NOTE — PROGRESS NOTES
"Chief Complaint:    Chief Complaint   Patient presents with    Benign Prostatic Hypertrophy     Follow up        Vital Signs:   /75 (BP Location: Right arm, Patient Position: Sitting, Cuff Size: Adult)   Pulse 77   Ht 175 cm (68.9\")   Wt 98.7 kg (217 lb 9.6 oz)   BMI 32.23 kg/m²   Body mass index is 32.23 kg/m².      HPI:  Dorita Conte is a 49 y.o. male who presents today for follow up    History of Present Illness  Mr. Conte returns to the clinic for follow-up for BPH with lower urinary tract symptoms.  He has been on several medications including finasteride, Flomax, and tadalafil 5 mg daily with some slight improvement.  He was last seen in the office and underwent a cystoscopy which revealed mild urethral narrowing and mild prostatic enlargement.  Did recommend to continue with observation and the patient returns today.  He reports that symptoms did improve for roughly 1 to 2 weeks after cystoscopy however they shortly returned.  He believes that the dilation of the camera helped significantly and he would like to undergo a repeat dilation in the OR.      Past Medical History:  Past Medical History:   Diagnosis Date    GERD (gastroesophageal reflux disease)     History of transfusion     [facility]    Kidney stones     Sleep apnea     no c-pap       Current Meds:  Current Outpatient Medications   Medication Sig Dispense Refill    cetirizine (zyrTEC) 10 MG tablet Take 1 tablet by mouth Daily.      finasteride (PROSCAR) 5 MG tablet Take 1 tablet by mouth Daily. 90 tablet 3    lisinopril (PRINIVIL,ZESTRIL) 10 MG tablet Take 1 tablet by mouth Daily.      meloxicam (MOBIC) 15 MG tablet Take 1 tablet by mouth Daily.      omeprazole (priLOSEC) 20 MG capsule Take 1 capsule by mouth Daily.      tadalafil (CIALIS) 5 MG tablet Take 1 tablet by mouth Daily. 30 tablet 5    tamsulosin (FLOMAX) 0.4 MG capsule 24 hr capsule Take 1 capsule by mouth Every 12 (Twelve) Hours. 90 capsule 7    Testosterone Cypionate " (DEPOTESTOTERONE CYPIONATE) 200 MG/ML injection Inject 1 mL into the appropriate muscle as directed by prescriber Every 14 (Fourteen) Days.       No current facility-administered medications for this visit.        Allergies:   No Known Allergies     Past Surgical History:  Past Surgical History:   Procedure Laterality Date    BACK SURGERY      cervical instrumentation    EXTRACORPOREAL SHOCK WAVE LITHOTRIPSY (ESWL) Left 03/25/2022    Procedure: EXTRACORPOREAL SHOCKWAVE LITHOTRIPSY;  Surgeon: Eric Garcia MD;  Location: [facility];  Service: Urology;  Laterality: Left;    EYE SURGERY      Lasik    FRACTURE SURGERY      nasal    HERNIA REPAIR Left     inguinal    URETEROSCOPY LASER LITHOTRIPSY WITH STENT INSERTION Left 08/10/2022    Procedure: URETEROSCOPY, RETROGRADE PYELOGRAM;  Surgeon: Eric Garcia MD;  Location: [facility];  Service: Urology;  Laterality: Left;    URETEROSCOPY LASER LITHOTRIPSY WITH STENT INSERTION Right 9/26/2022    Procedure: URETEROSCOPY LASER LITHOTRIPSY;  Surgeon: Eric Garcia MD;  Location: [facility];  Service: Urology;  Laterality: Right;       Social History:  Social History     Socioeconomic History    Marital status: Single   Tobacco Use    Smoking status: Never    Smokeless tobacco: Never   Vaping Use    Vaping status: Never Used   Substance and Sexual Activity    Alcohol use: Yes     Comment: occasional    Drug use: Never    Sexual activity: Defer       Family History:  History reviewed. No pertinent family history.    Review of Systems:  Review of Systems   Constitutional:  Negative for chills, fatigue and fever.   HENT:  Negative for congestion and sinus pressure.    Respiratory:  Negative for chest tightness and shortness of breath.    Cardiovascular:  Negative for chest pain.   Gastrointestinal:  Negative for abdominal pain, constipation, diarrhea, nausea and vomiting.   Genitourinary:  Positive for dysuria, frequency and urgency. Negative for  difficulty urinating, flank pain, hematuria, scrotal swelling and testicular pain.   Musculoskeletal:  Negative for back pain and neck pain.   Skin:  Negative for rash.   Neurological:  Negative for dizziness and headaches.   Hematological:  Does not bruise/bleed easily.   Psychiatric/Behavioral:  The patient is not nervous/anxious.        Physical Exam:  Physical Exam  Constitutional:       General: He is not in acute distress.     Appearance: Normal appearance.   HENT:      Head: Normocephalic and atraumatic.      Nose: Nose normal.      Mouth/Throat:      Mouth: Mucous membranes are moist.   Eyes:      Conjunctiva/sclera: Conjunctivae normal.   Cardiovascular:      Rate and Rhythm: Normal rate.      Pulses: Normal pulses.   Pulmonary:      Effort: Pulmonary effort is normal.   Abdominal:      Palpations: Abdomen is soft.   Musculoskeletal:         General: Normal range of motion.      Cervical back: Normal range of motion.   Skin:     General: Skin is warm.   Neurological:      General: No focal deficit present.      Mental Status: He is alert and oriented to person, place, and time.   Psychiatric:         Mood and Affect: Mood normal.         Behavior: Behavior normal.         Thought Content: Thought content normal.         Judgment: Judgment normal.         IPSS Questionnaire (AUA-7):  IPSS Questionnaire (AUA-7):                  IPSS Questionnaire (AUA-7):  Over the past month…    1)  Incomplete Emptying  How often have you had a sensation of not emptying your bladder?  1 - Less than 1 time in 5   2)  Frequency  How often have you had to urinate less than every two hours? 1 - Less than 1 time in 5   3)  Intermittency  How often have you found you stopped and started again several times when you urinated?  3 - About half the time   4) Urgency  How often have you found it difficult to postpone urination?  3 - About half the time   5) Weak Stream  How often have you had a weak urinary stream?  3 - About half the  time   6) Straining  How often have you had to push or strain to begin urination?  2 - Less than half the time   7) Nocturia  How many times did you typically get up at night to urinate?  1 - 1 time   Total Score:  14   The International Prostate Symptom Score (IPSS) is used to screen, diagnose, track symptoms of benign prostatic hyperplasia (BPH).    0-7 pts (Mild Symptoms)  / 8-19 pts (Moderate) / 20-35 (Severe)    Quality of life due to urinary symptoms:  If you were to spend the rest of your life with your urinary condition the way it is now, how would you feel about that? 3-Mixed   Urine Leakage (Incontinence) 0-No Leakage       Recent Image (CT and/or KUB):   CT Abdomen and Pelvis: No results found for this or any previous visit.     CT Stone Protocol: No results found for this or any previous visit.     KUB: Results for orders placed in visit on 10/11/24    XR abdomen kub    Narrative  EXAMINATION: XR ABDOMEN KUB-    CLINICAL INDICATION: dysuria/flank pain; N20.0-Calculus of kidney      COMPARISON: 9/12/2022    FINDINGS:  Moderate to large stool burden    Calcification over the left kidney.      This report was finalized on 10/11/2024 2:52 PM by Dr. Chinmay Lipscomb MD.       Labs:  Brief Urine Lab Results  (Last result in the past 365 days)        Color   Clarity   Blood   Leuk Est   Nitrite   Protein   CREAT   Urine HCG        04/02/25 1502 Brenda   Clear   Negative   Negative   Negative   Trace                 Office Visit on 04/02/2025   Component Date Value Ref Range Status    Color 04/02/2025 Brenda  Yellow, Straw, Dark Yellow, Brenda Final    Clarity, UA 04/02/2025 Clear  Clear Final    Specific Gravity  04/02/2025 1.025  1.005 - 1.030 Final    pH, Urine 04/02/2025 6.0  5.0 - 8.0 Final    Leukocytes 04/02/2025 Negative  Negative Final    Nitrite, UA 04/02/2025 Negative  Negative Final    Protein, POC 04/02/2025 Trace (A)  Negative mg/dL Final    Glucose, UA 04/02/2025 Negative  Negative mg/dL Final     Ketones, UA 04/02/2025 Trace (A)  Negative Final    Urobilinogen, UA 04/02/2025 Normal  Normal, 0.2 E.U./dL Final    Bilirubin 04/02/2025 Negative  Negative Final    Blood, UA 04/02/2025 Negative  Negative Final    Lot Number 04/02/2025 [id number]   Final    Expiration Date 04/02/2025 5/1/2026   Final        Procedure: None  Procedures     I have reviewed and agree with the above PMH, PSH, FMH, social history, medications, allergies, and labs.     Assessment/Plan:   Problem List Items Addressed This Visit       Benign prostatic hyperplasia with lower urinary tract symptoms    Urethral stricture - Primary    Relevant Orders    Case Request       Health Maintenance:   Health Maintenance Due   Topic Date Due    COLORECTAL CANCER SCREENING  Never done    HEPATITIS C SCREENING  Never done    ANNUAL PHYSICAL  Never done    Pneumococcal Vaccine 50+ (1 of 1 - PCV) Never done    COVID-19 Vaccine (4 - 2024-25 season) 09/01/2024    ZOSTER VACCINE (2 of 2) 03/14/2025        Smoking Counseling never smoked.  Never used smokeless tobacco.  Counseling given.    Urine Incontinence: Patient reports that he is not currently experiencing any symptoms of urinary incontinence.    Patient was given instructions and counseling regarding his condition or for health maintenance advice. Please see specific information pulled into the AVS if appropriate.    Patient Education:   Urethral stricture/narrowing -discussed with the patient the pathophysiology of this condition in detail.  Did discuss treatments which can include urethral dilation and reconstructive urethral surgery.  Given the patient's urethral narrowing is very minimal proceed forward with a cystoscopy with urethral dilation in the OR setting under general anesthesia by Dr. Garcia.  Did discuss the risk of this procedure in detail and he verbalized understanding.  BPH with low urinary tract symptoms -recommend to continue with Flomax and finasteride once daily as prescribed.   Did discuss the use of a TURP however patient declined surgical procedure.  Will proceed forward with urethral dilation    Visit Diagnoses:    ICD-10-CM ICD-9-CM   1. Other stricture of urethra in male  N35.819 598.8   2. Benign prostatic hyperplasia with weak urinary stream  N40.1 600.01    R39.12 788.62     A total of 25 minutes were spent coordinating this patient’s care in clinic today; 15 minutes of which were face-to-face with the patient, reviewing medical history and counseling on the current treatment and followup plan.  All questions were answered to patient's satisfaction.    Meds Ordered During Visit:  No orders of the defined types were placed in this encounter.      Follow Up Appointment: Urethral dilation  No follow-ups on file.      This document has been electronically signed by Carlos Sheriff PA-C   June 4, 2025 13:31 EDT    Part of this note may be an electronic transcription/translation of spoken language to printed text using the Dragon Dictation System.

## 2025-06-05 ENCOUNTER — TELEPHONE (OUTPATIENT)
Dept: UROLOGY | Facility: CLINIC | Age: 50
End: 2025-06-05
Payer: COMMERCIAL

## (undated) DEVICE — COR CYSTO: Brand: MEDLINE INDUSTRIES, INC.

## (undated) DEVICE — FIBR LASR HOLMIUM SLIMLINE SIS EZ 365U

## (undated) DEVICE — NITINOL STONE RETRIEVAL BASKET: Brand: ZERO TIP